# Patient Record
Sex: FEMALE | Race: WHITE | NOT HISPANIC OR LATINO | Employment: FULL TIME | URBAN - METROPOLITAN AREA
[De-identification: names, ages, dates, MRNs, and addresses within clinical notes are randomized per-mention and may not be internally consistent; named-entity substitution may affect disease eponyms.]

---

## 2017-01-17 ENCOUNTER — ALLSCRIPTS OFFICE VISIT (OUTPATIENT)
Dept: OTHER | Facility: OTHER | Age: 47
End: 2017-01-17

## 2017-01-17 DIAGNOSIS — Z12.31 ENCOUNTER FOR SCREENING MAMMOGRAM FOR MALIGNANT NEOPLASM OF BREAST: ICD-10-CM

## 2017-01-23 ENCOUNTER — LAB CONVERSION - ENCOUNTER (OUTPATIENT)
Dept: OTHER | Facility: OTHER | Age: 47
End: 2017-01-23

## 2017-01-23 LAB
ADDITIONAL INFORMATION (HISTORICAL): NORMAL
ADEQUACY: (HISTORICAL): NORMAL
COMMENT (HISTORICAL): NORMAL
CYTOTECHNOLOGIST: (HISTORICAL): NORMAL
HPV MRNA E6/E7 (HISTORICAL): NOT DETECTED
INTERPRETATION (HISTORICAL): NORMAL
LMP (HISTORICAL): NORMAL
PREV. BX: (HISTORICAL): NORMAL
PREV. PAP (HISTORICAL): NORMAL
SOURCE (HISTORICAL): NORMAL

## 2017-02-01 ENCOUNTER — ALLSCRIPTS OFFICE VISIT (OUTPATIENT)
Dept: OTHER | Facility: OTHER | Age: 47
End: 2017-02-01

## 2017-12-27 ENCOUNTER — GENERIC CONVERSION - ENCOUNTER (OUTPATIENT)
Dept: OTHER | Facility: OTHER | Age: 47
End: 2017-12-27

## 2018-01-08 ENCOUNTER — ALLSCRIPTS OFFICE VISIT (OUTPATIENT)
Dept: OTHER | Facility: OTHER | Age: 48
End: 2018-01-08

## 2018-01-09 NOTE — PROGRESS NOTES
Assessment   1  Shingles (053 9) (B02 9)    Plan   Shingles    · Gabapentin 100 MG Oral Capsule; 1 capsule at bedtime for 3 days, then 2 tabs at    bedtime for 3 days, then 3 capsules at bedtime   · ValACYclovir HCl - 1 GM Oral Tablet; TAKE 1 TABLET 3 times daily    Discussion/Summary      Patient presents for evaluation of herpes zoster  of this condition discussed with patient  start her on Valtrex for a week  patient has been experiencing symptoms of herpetic neuralgia- will start her on gabapentin at nighttime  may require this medication for the next 4-8 weeks  advised patient to apply topical Neosporin as needed and discontinue topical corticosteroids that she has been using within past few days  may use ibuprofen as needed daytime  advised patient to contact me with any further questions and concerns or persistence of pain  The patient was counseled regarding instructions for management,-- impressions,-- risks and benefits of treatment options  Possible side effects of new medications were reviewed with the patient/guardian today  The treatment plan was reviewed with the patient/guardian  The patient/guardian understands and agrees with the treatment plan      Chief Complaint   Pt is c/o rash on left shoulder  Pt states that it started as an itch but now she says it burns  Pt states that her arm pit also hurts  Pt states that she noticed this Saturday after lunch  History of Present Illness   HPI: Pruritic/burning rash left shoulder patient has noticed it ago of after exercising at the gym is concerned about possibility of ringworm states that her left arm an armpit is feeling slightly achy recent travel, no fever, no other symptoms          Review of Systems        Constitutional: No fever, no chills, feels well, no tiredness, no recent weight gain or loss        Cardiovascular: no complaints of slow or fast heart rate, no chest pain, no palpitations, no leg claudication or lower extremity edema  Respiratory: no complaints of shortness of breath, no wheezing, no dyspnea on exertion, no orthopnea or PND  Musculoskeletal: as noted in HPI  Integumentary: rash  Neurological: as noted in HPI  Active Problems   1  Acute sinusitis (461 9) (J01 90)   2  Allergic rhinitis (477 9) (J30 9)   3  Dermatofibroma (216 9) (D23 9)   4  Hematuria (599 70) (R31 9)   5  History of allergy (V15 09) (Z88 9)   6  Irritable bowel syndrome (564 1) (K58 9)    Past Medical History   1  History of Abdominal pain, periumbilical (190 34) (P25 28)   2  History of Acute upper respiratory infection (465 9) (J06 9)   3  History of Angiography Pulmonary   4  History of Bronchospasm (519 11) (J98 01)   5  History of Dysfunction of both eustachian tubes (381 81) (H69 83)   6  History of ECG Normal Variant   7  History of Encounter for gynecological examination (V72 31) (Z01 419)   8  History of acute bronchitis (V12 69) (Z87 09)   9  History of acute sinusitis (V12 69) (Z87 09)   10  History of acute sinusitis (V12 69) (Z87 09)   11  History of chest pain (V13 89) (Z87 898)   12  History of conjunctivitis (V12 49) (Z86 69)   13  History of dizziness (V13 89) (Z87 898)   14  History of fatigue (V13 89) (Z87 898)   15  History of fatigue (V13 89) (Z87 898)   16  History of fatigue (V13 89) (Z87 898)   17  History of onychomycosis (V12 09) (Z86 19)   18  History of screening mammography (V15 89) (Z92 89)   19  History of upper respiratory infection (V12 09) (Z87 09)   20  History of vertigo (V12 49) (Z87 898)   21  History of THUAN (middle ear effusion), right (381 4) (H65 91)   22  History of MRI Head FLAIR  Active Problems And Past Medical History Reviewed: The active problems and past medical history were reviewed and updated today  Family History   Mother    1  Family history of Malignant Melanoma Of The Skin (V16 8)   2  Family history of Thyroid Disorder (V18 19)  Father    3   Family history of Coronary Artery Disease (V17 49)  Paternal Uncle    4  Family history of Polymyalgia Rheumatica  Family History Reviewed: The family history was reviewed and updated today  Social History    · Being A Social Drinker   · Never A Smoker   · Working Full Time  The social history was reviewed and updated today  Surgical History   1  History of  Section Low Transverse  Surgical History Reviewed: The surgical history was reviewed and updated today  Current Meds    1  ProAir  (90 Base) MCG/ACT Inhalation Aerosol Solution; INHALE 2 PUFFS     EVERY 4-6 HOURS AS NEEDED; Therapy: [id number] to Recorded     The medication list was reviewed and updated today  Allergies   1  Sulfa Drugs    Vitals    Recorded: [id number] 03:22PM   Temperature 97 3 F   Heart Rate 70   Systolic 413   Diastolic 68   Height 5 ft 6 in   Weight 145 lb 4 oz   BMI Calculated 23 44   BSA Calculated 1 75     Physical Exam        Constitutional      General appearance: No acute distress, well appearing and well nourished  Musculoskeletal      Gait and station: Normal        Neurologic      Cranial nerves: Cranial nerves 2-12 intact  Psychiatric      Orientation to person, place, and time: Normal        Mood and affect: Normal        Additional Exam:  Vesicular rash on erythematous base left anterior and posterior shoulder, tiny patches similar rash at left clavicle  Signatures    Electronically signed by :  TEREZA Isabel ; 2018 11:14PM EST                       (Author)

## 2018-01-13 VITALS
DIASTOLIC BLOOD PRESSURE: 68 MMHG | HEART RATE: 76 BPM | HEIGHT: 66 IN | SYSTOLIC BLOOD PRESSURE: 112 MMHG | BODY MASS INDEX: 22.74 KG/M2 | WEIGHT: 141.5 LBS | RESPIRATION RATE: 14 BRPM | TEMPERATURE: 98.4 F

## 2018-01-15 VITALS
DIASTOLIC BLOOD PRESSURE: 74 MMHG | HEIGHT: 66 IN | BODY MASS INDEX: 22.74 KG/M2 | WEIGHT: 141.5 LBS | SYSTOLIC BLOOD PRESSURE: 110 MMHG

## 2018-01-23 VITALS
HEART RATE: 70 BPM | TEMPERATURE: 97.3 F | SYSTOLIC BLOOD PRESSURE: 122 MMHG | HEIGHT: 66 IN | BODY MASS INDEX: 23.34 KG/M2 | WEIGHT: 145.25 LBS | DIASTOLIC BLOOD PRESSURE: 68 MMHG

## 2018-01-24 VITALS
RESPIRATION RATE: 14 BRPM | TEMPERATURE: 97.5 F | SYSTOLIC BLOOD PRESSURE: 110 MMHG | BODY MASS INDEX: 22.88 KG/M2 | WEIGHT: 142.38 LBS | HEIGHT: 66 IN | HEART RATE: 74 BPM | DIASTOLIC BLOOD PRESSURE: 72 MMHG

## 2018-04-23 ENCOUNTER — OFFICE VISIT (OUTPATIENT)
Dept: FAMILY MEDICINE CLINIC | Facility: CLINIC | Age: 48
End: 2018-04-23
Payer: COMMERCIAL

## 2018-04-23 VITALS
HEART RATE: 58 BPM | WEIGHT: 142.4 LBS | BODY MASS INDEX: 22.88 KG/M2 | DIASTOLIC BLOOD PRESSURE: 74 MMHG | HEIGHT: 66 IN | TEMPERATURE: 98 F | RESPIRATION RATE: 14 BRPM | SYSTOLIC BLOOD PRESSURE: 128 MMHG

## 2018-04-23 DIAGNOSIS — Z13.6 SCREENING FOR CARDIOVASCULAR CONDITION: ICD-10-CM

## 2018-04-23 DIAGNOSIS — L63.9 ALOPECIA AREATA: Primary | ICD-10-CM

## 2018-04-23 PROCEDURE — 3008F BODY MASS INDEX DOCD: CPT | Performed by: FAMILY MEDICINE

## 2018-04-23 PROCEDURE — 99213 OFFICE O/P EST LOW 20 MIN: CPT | Performed by: FAMILY MEDICINE

## 2018-04-23 RX ORDER — CETIRIZINE HYDROCHLORIDE 10 MG/1
10 TABLET ORAL DAILY
COMMUNITY
End: 2022-03-28

## 2018-04-23 RX ORDER — MOMETASONE FUROATE 50 UG/1
2 SPRAY, METERED NASAL DAILY
COMMUNITY
Start: 2017-02-01

## 2018-04-23 RX ORDER — ALBUTEROL SULFATE 90 UG/1
2 AEROSOL, METERED RESPIRATORY (INHALATION)
COMMUNITY
Start: 2017-02-01 | End: 2019-04-12 | Stop reason: SDUPTHER

## 2018-04-23 NOTE — PROGRESS NOTES
FAMILY PRACTICE OFFICE VISIT       NAME: Asif Ortega  AGE: 50 y o  SEX: female       : 1970        MRN: [id number]    DATE: 2018  TIME: 6:35 AM    Assessment and Plan     Problem List Items Addressed This Visit     None      Visit Diagnoses     Alopecia areata    -  Primary    Relevant Medications    fluocinonide (LIDEX) 0 05 % cream    Other Relevant Orders    Ambulatory referral to Dermatology    CBC    Comprehensive metabolic panel    TSH, 3rd generation    Vitamin B12    Vitamin D 25 hydroxy    Screening for cardiovascular condition        Relevant Orders    Lipid panel        Patient presents for evaluation of scalp dermatitis, possible alopecia areata  Will start her on high potency topical corticosteroid cream   Referral to Dermatology, she may benefit from intradermal steroid injections  Will proceed with blood work as outlined above  There are no Patient Instructions on file for this visit  Chief Complaint     Chief Complaint   Patient presents with    Hair/Scalp Problem     Patient is here c/o a red itchy scalp x's 1+ mths  History of Present Illness     Patient presents for evaluation of localized pruritic area of left parietal scalp  It was noted by her hairdresser  Loss of hair growth in this localized area  Patient does admit to symptoms of fatigue and overall hair loss lately  No other balding spots  Patient has tried topical moisturizers with minimal improvement  Review of Systems   Review of Systems   Constitutional: Negative  Respiratory: Negative  Cardiovascular: Negative  Skin:        Hair loss, localized area of pruritis and balding left parietal scalp   Neurological: Negative  Psychiatric/Behavioral: Negative  Active Problem List   There is no problem list on file for this patient        Past Medical History:  Past Medical History:   Diagnosis Date    Bronchospasm     Chest pain     Conjunctivitis     Dizziness     Middle ear effusion, right     last assessed-2017    Onychomycosis     Vertigo        Past Surgical History:  Past Surgical History:   Procedure Laterality Date     SECTION, LOW TRANSVERSE      OTHER SURGICAL HISTORY      Angiography pulmonary-2007- no evidence of pulmonary embolus, no evidence of occult pulmonary disease, 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 just lateral to the costovertebral junction      OTHER SURGICAL HISTORY      ECG normal variant-2007       Family History:  Family History   Problem Relation Age of Onset    Skin cancer Mother      melanoma    Thyroid disease Mother     Coronary artery disease Father     Polymyalgia rheumatica Paternal Uncle        Social History:  Social History     Social History    Marital status: /Civil Union     Spouse name: N/A    Number of children: N/A    Years of education: N/A     Occupational History          working full time     Social History Main Topics    Smoking status: Never Smoker    Smokeless tobacco: Never Used    Alcohol use Yes      Comment: social    Drug use: No    Sexual activity: Not on file     Other Topics Concern    Not on file     Social History Narrative    No narrative on file       Objective     Vitals:    18 1605   BP: 128/74   Pulse: 58   Resp: 14   Temp: 98 °F (36 7 °C)     Wt Readings from Last 3 Encounters:   18 64 6 kg (142 lb 6 4 oz)   18 65 9 kg (145 lb 4 oz)   17 64 6 kg (142 lb 6 1 oz)       Physical Exam   Constitutional: She is oriented to person, place, and time  HENT:   Head: Normocephalic and atraumatic  Neck: Neck supple  Neurological: She is alert and oriented to person, place, and time     Skin:   Fairly well circumcised erythematous area of irritated skin and hair loss left parietal scalp, approximately 4x2 cm, no other balding areas noted throughout the scalp, no seborrhea, no noticeable hair thinning  Psychiatric: She has a normal mood and affect  Her behavior is normal    Nursing note and vitals reviewed  Pertinent Laboratory/Diagnostic Studies:  No results found for: GLUCOSE, BUN, CREATININE, CALCIUM, NA, K, CO2, CL  No results found for: ALT, AST, GGT, ALKPHOS, BILITOT    No results found for: WBC, HGB, HCT, MCV, PLT    No results found for: TSH    No results found for: CHOL  No results found for: TRIG  No results found for: HDL  No results found for: LDLCALC  No results found for: HGBA1C    Results for orders placed or performed in visit on 01/23/17   Thinprep Tis and HPV mRNA E6/E7 (Historical)   Result Value Ref Range    ADDITIONAL INFORMATION none given     LMP (HISTORICAL) 685526     PREV  PAP (HISTORICAL) NONE GIVEN     PREV  BX: (HISTORICAL) NONE GIVEN     SOURCE Cervix     Adequacy: (HISTORICAL)       Satisfactory for evaluation  Endocervical/transformation zone componentpresent  INTERPRETATION (HISTORICAL) Negative for intraepithelial lesion or malignancy  COMMENT       This Pap test has been evaluated with Get[employer] technology      CYTOTECHNOLOGIST: (HISTORICAL)       ALK, CT(ASCP)CT screening location: [facility], [place] [address]    HPV mRNA E6/E7 (HISTORICAL) Not Detected Not Detected       Orders Placed This Encounter   Procedures    CBC    Comprehensive metabolic panel    Lipid panel    TSH, 3rd generation    Vitamin B12    Vitamin D 25 hydroxy    Ambulatory referral to Dermatology       ALLERGIES:  Allergies   Allergen Reactions    Sulfa Antibiotics Hives       Current Medications     Current Outpatient Prescriptions   Medication Sig Dispense Refill    cetirizine (ZyrTEC) 10 mg tablet Take 10 mg by mouth daily      mometasone (NASONEX) 50 mcg/act nasal spray 2 sprays into each nostril daily      albuterol (PROAIR HFA) 90 mcg/act inhaler Inhale 2 puffs      fluocinonide (LIDEX) 0 05 % cream Apply topically 2 (two) times a day 60 g 1     No current facility-administered medications for this visit  Health Maintenance   There are no preventive care reminders to display for this patient    Immunization History   Administered Date(s) Administered    Influenza TIV (IM) 12/05/2009    Tdap 06/20/2013       Alfredo Luu MD

## 2018-04-28 ENCOUNTER — APPOINTMENT (OUTPATIENT)
Dept: LAB | Facility: CLINIC | Age: 48
End: 2018-04-28
Payer: COMMERCIAL

## 2018-04-28 DIAGNOSIS — L63.9 ALOPECIA AREATA: ICD-10-CM

## 2018-04-28 DIAGNOSIS — Z13.6 SCREENING FOR CARDIOVASCULAR CONDITION: ICD-10-CM

## 2018-04-28 LAB
25(OH)D3 SERPL-MCNC: 16.2 NG/ML (ref 30–100)
ALBUMIN SERPL BCP-MCNC: 3.7 G/DL (ref 3.5–5)
ALP SERPL-CCNC: 26 U/L (ref 46–116)
ALT SERPL W P-5'-P-CCNC: 25 U/L (ref 12–78)
ANION GAP SERPL CALCULATED.3IONS-SCNC: 6 MMOL/L (ref 4–13)
AST SERPL W P-5'-P-CCNC: 25 U/L (ref 5–45)
BILIRUB SERPL-MCNC: 0.92 MG/DL (ref 0.2–1)
BUN SERPL-MCNC: 17 MG/DL (ref 5–25)
CALCIUM SERPL-MCNC: 8.4 MG/DL (ref 8.3–10.1)
CHLORIDE SERPL-SCNC: 107 MMOL/L (ref 100–108)
CHOLEST SERPL-MCNC: 141 MG/DL (ref 50–200)
CO2 SERPL-SCNC: 28 MMOL/L (ref 21–32)
CREAT SERPL-MCNC: 0.87 MG/DL (ref 0.6–1.3)
ERYTHROCYTE [DISTWIDTH] IN BLOOD BY AUTOMATED COUNT: 12.3 % (ref 11.6–15.1)
GFR SERPL CREATININE-BSD FRML MDRD: 78 ML/MIN/1.73SQ M
GLUCOSE P FAST SERPL-MCNC: 85 MG/DL (ref 65–99)
HCT VFR BLD AUTO: 41.4 % (ref 34.8–46.1)
HDLC SERPL-MCNC: 81 MG/DL (ref 40–60)
HGB BLD-MCNC: 13.5 G/DL (ref 11.5–15.4)
LDLC SERPL CALC-MCNC: 50 MG/DL (ref 0–100)
MCH RBC QN AUTO: 32 PG (ref 26.8–34.3)
MCHC RBC AUTO-ENTMCNC: 32.6 G/DL (ref 31.4–37.4)
MCV RBC AUTO: 98 FL (ref 82–98)
NONHDLC SERPL-MCNC: 60 MG/DL
PLATELET # BLD AUTO: 317 THOUSANDS/UL (ref 149–390)
PMV BLD AUTO: 10.3 FL (ref 8.9–12.7)
POTASSIUM SERPL-SCNC: 4.4 MMOL/L (ref 3.5–5.3)
PROT SERPL-MCNC: 6.9 G/DL (ref 6.4–8.2)
RBC # BLD AUTO: 4.22 MILLION/UL (ref 3.81–5.12)
SODIUM SERPL-SCNC: 141 MMOL/L (ref 136–145)
TRIGL SERPL-MCNC: 48 MG/DL
TSH SERPL DL<=0.05 MIU/L-ACNC: 2.86 UIU/ML (ref 0.36–3.74)
VIT B12 SERPL-MCNC: 357 PG/ML (ref 100–900)
WBC # BLD AUTO: 4.97 THOUSAND/UL (ref 4.31–10.16)

## 2018-04-28 PROCEDURE — 85027 COMPLETE CBC AUTOMATED: CPT

## 2018-04-28 PROCEDURE — 82306 VITAMIN D 25 HYDROXY: CPT

## 2018-04-28 PROCEDURE — 80053 COMPREHEN METABOLIC PANEL: CPT

## 2018-04-28 PROCEDURE — 84443 ASSAY THYROID STIM HORMONE: CPT

## 2018-04-28 PROCEDURE — 80061 LIPID PANEL: CPT

## 2018-04-28 PROCEDURE — 82607 VITAMIN B-12: CPT

## 2018-04-28 PROCEDURE — 36415 COLL VENOUS BLD VENIPUNCTURE: CPT

## 2018-04-30 ENCOUNTER — ANNUAL EXAM (OUTPATIENT)
Dept: OBGYN CLINIC | Facility: CLINIC | Age: 48
End: 2018-04-30
Payer: COMMERCIAL

## 2018-04-30 VITALS
SYSTOLIC BLOOD PRESSURE: 120 MMHG | WEIGHT: 141.8 LBS | BODY MASS INDEX: 22.79 KG/M2 | DIASTOLIC BLOOD PRESSURE: 78 MMHG | HEIGHT: 66 IN

## 2018-04-30 DIAGNOSIS — Z01.419 WOMEN'S ANNUAL ROUTINE GYNECOLOGICAL EXAMINATION: Primary | ICD-10-CM

## 2018-04-30 PROCEDURE — S0612 ANNUAL GYNECOLOGICAL EXAMINA: HCPCS | Performed by: OBSTETRICS & GYNECOLOGY

## 2018-04-30 NOTE — PROGRESS NOTES
This is a 49-year-old white female she is a  2 para 2 with 2 prior  sections  Her menstrual cycles are regular and predictable but sometimes heavy  Will now keep track of that  She may need hormonal intervention  Her current method of contraception includes condoms  She is happy with this method  Denies any any major  GI complaint  There is no problem with intimacy  Family history significant for mother and father with heart disease requiring valve replacement  Upon further questioning patient admits that she is under increased stress at home and at work as a   She denies depression however she is happy with her weight  She exercises on a regular basis  She has a dentist on a regular basis  Review of systems positive for increased stress at home work  Social history is negative for tobacco positive for social alcohol      Surgical history significant for 2 prior  section      Medication she has been treated for sinus infections  She also had a recent episode of shingles which is now every resolved      Physical exam    This is a well-developed well-nourished petite white female  She is in no acute distress  Her HEENT is was within normal limits  Cardiac exam shows a regular rhythm and rate  Lungs are clear to A&P  Breast cyst bilaterally bilaterally small symmetrical without masses no pain no lumps  Axillae:  Gland clear bilaterally  Abdominal exam is soft and there are no masses there is prior  scar is healed well  Pelvic examination the external genitalia normal limits the vagina is clean uterus is small anterior adnexa clear  A Pap smear was performed  Impression stable gyn examination  Pap smear was performed  Some irregular cycles prior secondary to perimenopause  Will continue to monitor  We have asked the patient to keep a menstrual calendar keep me informed of her progress  She will make a mammogram as needed    Return my office in 1 year

## 2018-04-30 NOTE — PATIENT INSTRUCTIONS
The patient was informed of a stable gyn examination  A Pap smear was performed  She will keep her menstrual calendar  Her cycles may be reflective her early perimenopausal changes

## 2018-05-02 ENCOUNTER — TELEPHONE (OUTPATIENT)
Dept: FAMILY MEDICINE CLINIC | Facility: CLINIC | Age: 48
End: 2018-05-02

## 2018-05-02 DIAGNOSIS — E55.9 VITAMIN D DEFICIENCY: Primary | ICD-10-CM

## 2018-05-02 DIAGNOSIS — E53.8 VITAMIN B12 DEFICIENCY: ICD-10-CM

## 2018-05-02 RX ORDER — ERGOCALCIFEROL 1.25 MG/1
50000 CAPSULE ORAL WEEKLY
Qty: 4 CAPSULE | Refills: 2 | Status: SHIPPED | OUTPATIENT
Start: 2018-05-02 | End: 2019-07-31

## 2018-05-02 NOTE — TELEPHONE ENCOUNTER
Please contact patient  I received her blood work  It was all normal aside from very low level of vitamin-D that could be contributing to hair loss and rash  And decreased level of vitamin B12  Her vitamin-D was 16 with optimal level of over 40  Vitamin B12 level was 350 with optimal level of 500-600    At this time I advised patient to start vitamin D2 41040 units once a week for the next 12 weeks  We need to recheck blood work in 3 months  She should also start vitamin B12 over the counter, 1000 mcg daily  I will send prescription for vitamin D2 to her pharmacy    Blood work orders printed, thank you

## 2018-05-03 LAB
CLINICAL INFO: NORMAL
CYTO CVX: NORMAL
CYTOLOGY CMNT CVX/VAG CYTO-IMP: NORMAL
DATE PREVIOUS BX: NORMAL
HPV E6+E7 MRNA CVX QL NAA+PROBE: NOT DETECTED
LMP START DATE: NORMAL
SL AMB PREV. PAP:: NORMAL
SPECIMEN SOURCE CVX/VAG CYTO: NORMAL

## 2018-08-09 ENCOUNTER — APPOINTMENT (OUTPATIENT)
Dept: LAB | Facility: CLINIC | Age: 48
End: 2018-08-09
Payer: COMMERCIAL

## 2018-08-09 ENCOUNTER — TRANSCRIBE ORDERS (OUTPATIENT)
Dept: LAB | Facility: CLINIC | Age: 48
End: 2018-08-09

## 2018-08-09 DIAGNOSIS — E55.9 VITAMIN D DEFICIENCY: ICD-10-CM

## 2018-08-09 DIAGNOSIS — E53.8 VITAMIN B12 DEFICIENCY: ICD-10-CM

## 2018-08-09 LAB
25(OH)D3 SERPL-MCNC: 33.3 NG/ML (ref 30–100)
VIT B12 SERPL-MCNC: 357 PG/ML (ref 100–900)

## 2018-08-09 PROCEDURE — 36415 COLL VENOUS BLD VENIPUNCTURE: CPT

## 2018-08-09 PROCEDURE — 82306 VITAMIN D 25 HYDROXY: CPT

## 2018-08-09 PROCEDURE — 82607 VITAMIN B-12: CPT

## 2018-08-13 ENCOUNTER — TELEPHONE (OUTPATIENT)
Dept: FAMILY MEDICINE CLINIC | Facility: CLINIC | Age: 48
End: 2018-08-13

## 2018-08-13 NOTE — TELEPHONE ENCOUNTER
----- Message from Dewayne Alfonso MD sent at 8/12/2018  5:43 PM EDT -----  Please contact patient  Her vitamin-D level has improved significantly  Patient should complete prescription strength vitamin D2 once a week and then switch to over-the-counter vitamin D3 2000 U once a day  Her vitamin B12 level has not unfortunately changed with over-the-counter oral supplements  I believe she would be a good candidate for vitamin B12 injections as they could provide improved/increased energy level  We can schedule with the nurse as B12 injections every 2 weeks x 3  and then once a month ( if pt is agreeable)   Patient will discontinue vitamin B12 over-the-counter once we start injections    Thanks

## 2018-08-15 ENCOUNTER — CLINICAL SUPPORT (OUTPATIENT)
Dept: FAMILY MEDICINE CLINIC | Facility: CLINIC | Age: 48
End: 2018-08-15
Payer: COMMERCIAL

## 2018-08-15 DIAGNOSIS — E53.8 B12 DEFICIENCY: Primary | ICD-10-CM

## 2018-08-15 PROCEDURE — 96372 THER/PROPH/DIAG INJ SC/IM: CPT | Performed by: FAMILY MEDICINE

## 2018-08-16 RX ORDER — CYANOCOBALAMIN 1000 UG/ML
1000 INJECTION INTRAMUSCULAR; SUBCUTANEOUS ONCE
Status: COMPLETED | OUTPATIENT
Start: 2018-08-16 | End: 2018-08-16

## 2018-08-16 RX ADMIN — CYANOCOBALAMIN 1000 MCG: 1000 INJECTION INTRAMUSCULAR; SUBCUTANEOUS at 13:43

## 2018-08-29 ENCOUNTER — CLINICAL SUPPORT (OUTPATIENT)
Dept: FAMILY MEDICINE CLINIC | Facility: CLINIC | Age: 48
End: 2018-08-29
Payer: COMMERCIAL

## 2018-08-29 VITALS — TEMPERATURE: 97.9 F

## 2018-08-29 DIAGNOSIS — E53.8 B12 DEFICIENCY: Primary | ICD-10-CM

## 2018-08-29 PROCEDURE — 96372 THER/PROPH/DIAG INJ SC/IM: CPT | Performed by: FAMILY MEDICINE

## 2018-08-29 RX ORDER — CYANOCOBALAMIN 1000 UG/ML
1000 INJECTION INTRAMUSCULAR; SUBCUTANEOUS
Status: DISCONTINUED | OUTPATIENT
Start: 2018-08-29 | End: 2022-03-28

## 2018-08-29 RX ADMIN — CYANOCOBALAMIN 1000 MCG: 1000 INJECTION INTRAMUSCULAR; SUBCUTANEOUS at 17:02

## 2018-09-12 ENCOUNTER — CLINICAL SUPPORT (OUTPATIENT)
Dept: FAMILY MEDICINE CLINIC | Facility: CLINIC | Age: 48
End: 2018-09-12
Payer: COMMERCIAL

## 2018-09-12 DIAGNOSIS — E53.8 B12 DEFICIENCY: Primary | ICD-10-CM

## 2018-09-12 PROCEDURE — 96372 THER/PROPH/DIAG INJ SC/IM: CPT

## 2018-09-12 RX ORDER — CYANOCOBALAMIN 1000 UG/ML
1000 INJECTION INTRAMUSCULAR; SUBCUTANEOUS
Status: DISCONTINUED | OUTPATIENT
Start: 2018-09-12 | End: 2022-03-28

## 2018-09-12 RX ADMIN — CYANOCOBALAMIN 1000 MCG: 1000 INJECTION INTRAMUSCULAR; SUBCUTANEOUS at 16:44

## 2018-10-17 ENCOUNTER — CLINICAL SUPPORT (OUTPATIENT)
Dept: FAMILY MEDICINE CLINIC | Facility: CLINIC | Age: 48
End: 2018-10-17
Payer: COMMERCIAL

## 2018-10-17 DIAGNOSIS — E53.8 VITAMIN B 12 DEFICIENCY: Primary | ICD-10-CM

## 2018-10-17 RX ORDER — CYANOCOBALAMIN 1000 UG/ML
1000 INJECTION INTRAMUSCULAR; SUBCUTANEOUS
Status: DISCONTINUED | OUTPATIENT
Start: 2018-10-17 | End: 2022-03-28

## 2018-10-17 RX ADMIN — CYANOCOBALAMIN 1000 MCG: 1000 INJECTION INTRAMUSCULAR; SUBCUTANEOUS at 17:17

## 2018-11-19 ENCOUNTER — CLINICAL SUPPORT (OUTPATIENT)
Dept: FAMILY MEDICINE CLINIC | Facility: CLINIC | Age: 48
End: 2018-11-19
Payer: COMMERCIAL

## 2018-11-19 DIAGNOSIS — E53.8 B12 DEFICIENCY: Primary | ICD-10-CM

## 2018-11-19 RX ORDER — CYANOCOBALAMIN 1000 UG/ML
1000 INJECTION INTRAMUSCULAR; SUBCUTANEOUS ONCE
Status: COMPLETED | OUTPATIENT
Start: 2018-11-19 | End: 2018-11-19

## 2018-11-19 RX ADMIN — CYANOCOBALAMIN 1000 MCG: 1000 INJECTION INTRAMUSCULAR; SUBCUTANEOUS at 17:55

## 2018-12-19 ENCOUNTER — CLINICAL SUPPORT (OUTPATIENT)
Dept: FAMILY MEDICINE CLINIC | Facility: CLINIC | Age: 48
End: 2018-12-19
Payer: COMMERCIAL

## 2018-12-19 DIAGNOSIS — E53.8 VITAMIN B 12 DEFICIENCY: Primary | ICD-10-CM

## 2018-12-19 RX ORDER — CYANOCOBALAMIN 1000 UG/ML
1000 INJECTION INTRAMUSCULAR; SUBCUTANEOUS
Status: DISCONTINUED | OUTPATIENT
Start: 2018-12-19 | End: 2022-03-28

## 2018-12-19 RX ADMIN — CYANOCOBALAMIN 1000 MCG: 1000 INJECTION INTRAMUSCULAR; SUBCUTANEOUS at 16:32

## 2019-01-23 ENCOUNTER — CLINICAL SUPPORT (OUTPATIENT)
Dept: FAMILY MEDICINE CLINIC | Facility: CLINIC | Age: 49
End: 2019-01-23
Payer: COMMERCIAL

## 2019-01-23 DIAGNOSIS — E53.8 B12 DEFICIENCY: Primary | ICD-10-CM

## 2019-01-23 PROCEDURE — 96372 THER/PROPH/DIAG INJ SC/IM: CPT

## 2019-01-23 RX ORDER — CYANOCOBALAMIN 1000 UG/ML
1000 INJECTION INTRAMUSCULAR; SUBCUTANEOUS ONCE
Status: COMPLETED | OUTPATIENT
Start: 2019-01-23 | End: 2019-01-23

## 2019-01-23 RX ADMIN — CYANOCOBALAMIN 1000 MCG: 1000 INJECTION INTRAMUSCULAR; SUBCUTANEOUS at 18:27

## 2019-02-21 ENCOUNTER — CLINICAL SUPPORT (OUTPATIENT)
Dept: FAMILY MEDICINE CLINIC | Facility: CLINIC | Age: 49
End: 2019-02-21
Payer: COMMERCIAL

## 2019-02-21 ENCOUNTER — TELEPHONE (OUTPATIENT)
Dept: FAMILY MEDICINE CLINIC | Facility: CLINIC | Age: 49
End: 2019-02-21

## 2019-02-21 DIAGNOSIS — E53.8 VITAMIN B12 DEFICIENCY: Primary | ICD-10-CM

## 2019-02-21 PROCEDURE — 96372 THER/PROPH/DIAG INJ SC/IM: CPT

## 2019-02-21 RX ORDER — CYANOCOBALAMIN 1000 UG/ML
1000 INJECTION INTRAMUSCULAR; SUBCUTANEOUS ONCE
Status: COMPLETED | OUTPATIENT
Start: 2019-02-21 | End: 2019-02-21

## 2019-02-21 RX ADMIN — CYANOCOBALAMIN 1000 MCG: 1000 INJECTION INTRAMUSCULAR; SUBCUTANEOUS at 17:23

## 2019-04-12 ENCOUNTER — OFFICE VISIT (OUTPATIENT)
Dept: FAMILY MEDICINE CLINIC | Facility: CLINIC | Age: 49
End: 2019-04-12
Payer: COMMERCIAL

## 2019-04-12 VITALS
TEMPERATURE: 98.1 F | WEIGHT: 140 LBS | HEART RATE: 68 BPM | SYSTOLIC BLOOD PRESSURE: 118 MMHG | HEIGHT: 66 IN | RESPIRATION RATE: 16 BRPM | DIASTOLIC BLOOD PRESSURE: 82 MMHG | BODY MASS INDEX: 22.5 KG/M2

## 2019-04-12 DIAGNOSIS — J01.91 ACUTE RECURRENT SINUSITIS, UNSPECIFIED LOCATION: Primary | ICD-10-CM

## 2019-04-12 DIAGNOSIS — J30.89 ALLERGIC RHINITIS DUE TO OTHER ALLERGIC TRIGGER, UNSPECIFIED SEASONALITY: ICD-10-CM

## 2019-04-12 PROCEDURE — 99213 OFFICE O/P EST LOW 20 MIN: CPT | Performed by: FAMILY MEDICINE

## 2019-04-12 PROCEDURE — 3008F BODY MASS INDEX DOCD: CPT | Performed by: FAMILY MEDICINE

## 2019-04-12 RX ORDER — ALBUTEROL SULFATE 90 UG/1
2 AEROSOL, METERED RESPIRATORY (INHALATION) EVERY 4 HOURS PRN
Qty: 1 INHALER | Refills: 1 | Status: SHIPPED | OUTPATIENT
Start: 2019-04-12 | End: 2021-09-27 | Stop reason: SDUPTHER

## 2019-04-12 RX ORDER — CEFPROZIL 500 MG/1
500 TABLET, FILM COATED ORAL 2 TIMES DAILY
Qty: 14 TABLET | Refills: 0 | Status: SHIPPED | OUTPATIENT
Start: 2019-04-12 | End: 2019-04-19

## 2019-04-23 ENCOUNTER — TELEPHONE (OUTPATIENT)
Dept: FAMILY MEDICINE CLINIC | Facility: CLINIC | Age: 49
End: 2019-04-23

## 2019-04-23 ENCOUNTER — CLINICAL SUPPORT (OUTPATIENT)
Dept: FAMILY MEDICINE CLINIC | Facility: CLINIC | Age: 49
End: 2019-04-23
Payer: COMMERCIAL

## 2019-04-23 DIAGNOSIS — E53.8 VITAMIN B12 DEFICIENCY: Primary | ICD-10-CM

## 2019-04-23 PROCEDURE — 96372 THER/PROPH/DIAG INJ SC/IM: CPT

## 2019-04-23 RX ORDER — CYANOCOBALAMIN 1000 UG/ML
1000 INJECTION INTRAMUSCULAR; SUBCUTANEOUS ONCE
Status: COMPLETED | OUTPATIENT
Start: 2019-04-23 | End: 2019-04-23

## 2019-04-23 RX ADMIN — CYANOCOBALAMIN 1000 MCG: 1000 INJECTION INTRAMUSCULAR; SUBCUTANEOUS at 16:50

## 2019-04-24 DIAGNOSIS — E53.8 VITAMIN B12 DEFICIENCY: Primary | ICD-10-CM

## 2019-04-24 DIAGNOSIS — Z13.6 SCREENING FOR CARDIOVASCULAR CONDITION: ICD-10-CM

## 2019-04-24 DIAGNOSIS — R53.83 FATIGUE, UNSPECIFIED TYPE: ICD-10-CM

## 2019-05-04 ENCOUNTER — APPOINTMENT (OUTPATIENT)
Dept: LAB | Facility: CLINIC | Age: 49
End: 2019-05-04
Payer: COMMERCIAL

## 2019-05-04 ENCOUNTER — TRANSCRIBE ORDERS (OUTPATIENT)
Dept: LAB | Facility: CLINIC | Age: 49
End: 2019-05-04

## 2019-05-04 DIAGNOSIS — Z13.6 SCREENING FOR CARDIOVASCULAR CONDITION: ICD-10-CM

## 2019-05-04 DIAGNOSIS — R53.83 FATIGUE, UNSPECIFIED TYPE: ICD-10-CM

## 2019-05-04 DIAGNOSIS — L93.0 LUPUS ERYTHEMATOSUS, UNSPECIFIED FORM: Primary | ICD-10-CM

## 2019-05-04 DIAGNOSIS — L93.0 LUPUS ERYTHEMATOSUS, UNSPECIFIED FORM: ICD-10-CM

## 2019-05-04 DIAGNOSIS — E53.8 VITAMIN B12 DEFICIENCY: ICD-10-CM

## 2019-05-04 LAB
ALBUMIN SERPL BCP-MCNC: 3.9 G/DL (ref 3.5–5)
ALP SERPL-CCNC: 24 U/L (ref 46–116)
ALT SERPL W P-5'-P-CCNC: 19 U/L (ref 12–78)
ANION GAP SERPL CALCULATED.3IONS-SCNC: 4 MMOL/L (ref 4–13)
AST SERPL W P-5'-P-CCNC: 18 U/L (ref 5–45)
BILIRUB SERPL-MCNC: 1.01 MG/DL (ref 0.2–1)
BUN SERPL-MCNC: 16 MG/DL (ref 5–25)
CALCIUM SERPL-MCNC: 8.7 MG/DL (ref 8.3–10.1)
CHLORIDE SERPL-SCNC: 106 MMOL/L (ref 100–108)
CHOLEST SERPL-MCNC: 138 MG/DL (ref 50–200)
CO2 SERPL-SCNC: 28 MMOL/L (ref 21–32)
CREAT SERPL-MCNC: 0.92 MG/DL (ref 0.6–1.3)
ERYTHROCYTE [DISTWIDTH] IN BLOOD BY AUTOMATED COUNT: 12 % (ref 11.6–15.1)
GFR SERPL CREATININE-BSD FRML MDRD: 73 ML/MIN/1.73SQ M
GLUCOSE P FAST SERPL-MCNC: 87 MG/DL (ref 65–99)
HCT VFR BLD AUTO: 39.3 % (ref 34.8–46.1)
HDLC SERPL-MCNC: 76 MG/DL (ref 40–60)
HGB BLD-MCNC: 12.8 G/DL (ref 11.5–15.4)
LDLC SERPL CALC-MCNC: 53 MG/DL (ref 0–100)
MCH RBC QN AUTO: 32.2 PG (ref 26.8–34.3)
MCHC RBC AUTO-ENTMCNC: 32.6 G/DL (ref 31.4–37.4)
MCV RBC AUTO: 99 FL (ref 82–98)
PLATELET # BLD AUTO: 298 THOUSANDS/UL (ref 149–390)
PMV BLD AUTO: 10.4 FL (ref 8.9–12.7)
POTASSIUM SERPL-SCNC: 4.2 MMOL/L (ref 3.5–5.3)
PROT SERPL-MCNC: 7.1 G/DL (ref 6.4–8.2)
RBC # BLD AUTO: 3.97 MILLION/UL (ref 3.81–5.12)
SODIUM SERPL-SCNC: 138 MMOL/L (ref 136–145)
TRIGL SERPL-MCNC: 45 MG/DL
TSH SERPL DL<=0.05 MIU/L-ACNC: 3.33 UIU/ML (ref 0.36–3.74)
VIT B12 SERPL-MCNC: 599 PG/ML (ref 100–900)
WBC # BLD AUTO: 4.86 THOUSAND/UL (ref 4.31–10.16)

## 2019-05-04 PROCEDURE — 86038 ANTINUCLEAR ANTIBODIES: CPT

## 2019-05-04 PROCEDURE — 80053 COMPREHEN METABOLIC PANEL: CPT

## 2019-05-04 PROCEDURE — 85027 COMPLETE CBC AUTOMATED: CPT

## 2019-05-04 PROCEDURE — 84443 ASSAY THYROID STIM HORMONE: CPT

## 2019-05-04 PROCEDURE — 82607 VITAMIN B-12: CPT

## 2019-05-04 PROCEDURE — 80061 LIPID PANEL: CPT

## 2019-05-04 PROCEDURE — 36415 COLL VENOUS BLD VENIPUNCTURE: CPT

## 2019-05-06 ENCOUNTER — TELEPHONE (OUTPATIENT)
Dept: FAMILY MEDICINE CLINIC | Facility: CLINIC | Age: 49
End: 2019-05-06

## 2019-05-06 LAB — RYE IGE QN: NEGATIVE

## 2019-06-05 ENCOUNTER — CLINICAL SUPPORT (OUTPATIENT)
Dept: FAMILY MEDICINE CLINIC | Facility: CLINIC | Age: 49
End: 2019-06-05
Payer: COMMERCIAL

## 2019-06-05 DIAGNOSIS — E53.8 VITAMIN B12 DEFICIENCY: Primary | ICD-10-CM

## 2019-06-05 PROCEDURE — 96372 THER/PROPH/DIAG INJ SC/IM: CPT

## 2019-06-05 RX ORDER — CYANOCOBALAMIN 1000 UG/ML
1000 INJECTION INTRAMUSCULAR; SUBCUTANEOUS
Status: SHIPPED | OUTPATIENT
Start: 2019-06-05

## 2019-06-05 RX ADMIN — CYANOCOBALAMIN 1000 MCG: 1000 INJECTION INTRAMUSCULAR; SUBCUTANEOUS at 16:13

## 2019-07-05 ENCOUNTER — CLINICAL SUPPORT (OUTPATIENT)
Dept: FAMILY MEDICINE CLINIC | Facility: CLINIC | Age: 49
End: 2019-07-05
Payer: COMMERCIAL

## 2019-07-05 DIAGNOSIS — E53.8 VITAMIN B12 DEFICIENCY: Primary | ICD-10-CM

## 2019-07-05 PROCEDURE — 96372 THER/PROPH/DIAG INJ SC/IM: CPT

## 2019-07-05 RX ORDER — CYANOCOBALAMIN 1000 UG/ML
1000 INJECTION INTRAMUSCULAR; SUBCUTANEOUS
Status: DISCONTINUED | OUTPATIENT
Start: 2019-07-05 | End: 2022-03-28

## 2019-07-05 RX ADMIN — CYANOCOBALAMIN 1000 MCG: 1000 INJECTION INTRAMUSCULAR; SUBCUTANEOUS at 14:04

## 2019-07-30 RX ORDER — BETAMETHASONE VALERATE 0.1 %
LOTION (ML) TOPICAL
Refills: 0 | COMMUNITY
Start: 2019-06-06 | End: 2020-02-03

## 2019-07-31 ENCOUNTER — OFFICE VISIT (OUTPATIENT)
Dept: FAMILY MEDICINE CLINIC | Facility: CLINIC | Age: 49
End: 2019-07-31
Payer: COMMERCIAL

## 2019-07-31 VITALS
HEIGHT: 66 IN | RESPIRATION RATE: 16 BRPM | WEIGHT: 137.6 LBS | SYSTOLIC BLOOD PRESSURE: 114 MMHG | HEART RATE: 67 BPM | DIASTOLIC BLOOD PRESSURE: 80 MMHG | TEMPERATURE: 98.6 F | OXYGEN SATURATION: 100 % | BODY MASS INDEX: 22.11 KG/M2

## 2019-07-31 DIAGNOSIS — J01.91 ACUTE RECURRENT SINUSITIS, UNSPECIFIED LOCATION: Primary | ICD-10-CM

## 2019-07-31 PROCEDURE — 99213 OFFICE O/P EST LOW 20 MIN: CPT | Performed by: FAMILY MEDICINE

## 2019-07-31 RX ORDER — PREDNISONE 10 MG/1
TABLET ORAL
Qty: 20 TABLET | Refills: 0 | Status: SHIPPED | OUTPATIENT
Start: 2019-07-31 | End: 2020-02-03

## 2019-07-31 RX ORDER — AMOXICILLIN AND CLAVULANATE POTASSIUM 875; 125 MG/1; MG/1
1 TABLET, FILM COATED ORAL 2 TIMES DAILY WITH MEALS
Qty: 20 TABLET | Refills: 0 | Status: SHIPPED | OUTPATIENT
Start: 2019-07-31 | End: 2019-08-10

## 2019-07-31 NOTE — PROGRESS NOTES
FAMILY PRACTICE OFFICE VISIT       NAME: Gabi Chacko  AGE: 44 y o  SEX: female       : 1970        MRN: [id number]        Assessment and Plan     Problem List Items Addressed This Visit     None      Visit Diagnoses     Acute recurrent sinusitis, unspecified location    -  Primary    Relevant Medications    amoxicillin-clavulanate (AUGMENTIN) 875-125 mg per tablet    predniSONE 10 mg tablet        Patient presents for evaluation of acute sinusitis  Will start her on Augmentin twice a day for 10 days along with prednisone taper for 8 days  Patient will continue with Flonase at bedtime, saline rinses and gargling  She will contact me in a few days if symptoms are not improving significantly  There are no Patient Instructions on file for this visit  Discussed with the patient and all questioned fully answered  She will call me if any problems arise  M*Modal software was used to dictate this note  It may contain errors with dictating incorrect words/spelling  Please contact provider directly with any questions  Chief Complaint     Chief Complaint   Patient presents with    Cold Like Symptoms     Pt is here for sinus congestion, cough 1 + wk       History of Present Illness     Patient presents for evaluation of sinus symptoms  She has been experiencing significant sinus pressure, congestion, postnasal drip and sore throat within past 10 days  Patient recently flew back and forth [facility]  She is concerned about forthcoming travel to [facility] in a few days  Patient has developed ear pressure this morning  She denies fever, chest tightness or cough  No wheezing or colored mucus expectoration  Patient has tried multiple over-the-counter medications without relief of her symptoms  She is also concerned about swollen submandibular glands  Review of Systems   Review of Systems   Constitutional: Negative      HENT: Positive for congestion, ear pain, postnasal drip, sinus pressure, sinus pain and sore throat  Eyes: Negative  Respiratory: Negative  Cardiovascular: Negative  Neurological: Negative  Hematological: Negative  Psychiatric/Behavioral: Negative          Active Problem List     Patient Active Problem List   Diagnosis    Vitamin D deficiency    Vitamin B12 deficiency    Allergic rhinitis       Past Medical History:  Past Medical History:   Diagnosis Date    Bronchospasm     Chest pain     Conjunctivitis     Dizziness     Middle ear effusion, right     last assessed-2017    Onychomycosis     Vertigo        Past Surgical History:  Past Surgical History:   Procedure Laterality Date     SECTION, LOW TRANSVERSE      OTHER SURGICAL HISTORY      Angiography pulmonary-2007- no evidence of pulmonary embolus, no evidence of occult pulmonary disease, 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 just lateral to the costovertebral junction      OTHER SURGICAL HISTORY      ECG normal variant-2007       Family History:  Family History   Problem Relation Age of Onset    Skin cancer Mother         melanoma    Thyroid disease Mother     Coronary artery disease Father     Polymyalgia rheumatica Paternal Uncle        Social History:  Social History     Socioeconomic History    Marital status: /Civil Union     Spouse name: Not on file    Number of children: Not on file    Years of education: Not on file    Highest education level: Not on file   Occupational History     Comment: working full time   Social Needs    Financial resource strain: Not on file    Food insecurity:     Worry: Not on file     Inability: Not on file   [place] needs:     Medical: Not on file     Non-medical: Not on file   Tobacco Use    Smoking status: Never Smoker    Smokeless tobacco: Never Used   Substance and Sexual Activity    Alcohol use: Not Currently     Comment: social    Drug use: No    Sexual activity: Yes     Partners: Male     Birth control/protection: Condom   Lifestyle    Physical activity:     Days per week: Not on file     Minutes per session: Not on file    Stress: Not on file   Relationships    Social connections:     Talks on phone: Not on file     Gets together: Not on file     Attends Restoration service: Not on file     Active member of club or organization: Not on file     Attends meetings of clubs or organizations: Not on file     Relationship status: Not on file    Intimate partner violence:     Fear of current or ex partner: Not on file     Emotionally abused: Not on file     Physically abused: Not on file     Forced sexual activity: Not on file   Other Topics Concern    Not on file   Social History Narrative    Not on file     PHQ-9 Depression Screening    PHQ-9:    Frequency of the following problems over the past two weeks:       Little interest or pleasure in doing things:  0 - not at all  Feeling down, depressed, or hopeless:  0 - not at all  PHQ-2 Score:  0               Objective     Vitals:    07/31/19 1403   BP: 114/80   Pulse: 67   Resp: 16   Temp: 98 6 °F (37 °C)   TempSrc: Tympanic   SpO2: 100%   Weight: 62 4 kg (137 lb 9 6 oz)   Height: 5' 6" (1 676 m)     Wt Readings from Last 3 Encounters:   07/31/19 62 4 kg (137 lb 9 6 oz)   04/12/19 63 5 kg (140 lb)   04/30/18 64 3 kg (141 lb 12 8 oz)       Physical Exam   Constitutional: She is oriented to person, place, and time  She appears well-developed and well-nourished  HENT:   Head: Normocephalic and atraumatic  Right Ear: Tympanic membrane normal    Left Ear: Tympanic membrane normal    Nose: Mucosal edema present  Mouth/Throat: Uvula is midline  Posterior oropharyngeal erythema (Copious green postnasal drip) present  No oropharyngeal exudate  Eyes: Conjunctivae are normal    Neck: Neck supple  Cardiovascular: Normal rate, regular rhythm and normal heart sounds     Pulmonary/Chest: Effort normal and breath sounds normal  No respiratory distress  She has no wheezes  She has no rales  Lymphadenopathy:     She has cervical adenopathy ( submandibular bilaterally)  Neurological: She is alert and oriented to person, place, and time  She has normal reflexes  No cranial nerve deficit  Psychiatric: She has a normal mood and affect  Her behavior is normal    Nursing note and vitals reviewed        Pertinent Laboratory/Diagnostic Studies:  Lab Results   Component Value Date    BUN 16 05/04/2019    CREATININE 0 92 05/04/2019    CALCIUM 8 7 05/04/2019    K 4 2 05/04/2019    CO2 28 05/04/2019     05/04/2019     Lab Results   Component Value Date    ALT 19 05/04/2019    AST 18 05/04/2019    ALKPHOS 24 (L) 05/04/2019       Lab Results   Component Value Date    WBC 4 86 05/04/2019    HGB 12 8 05/04/2019    HCT 39 3 05/04/2019    MCV 99 (H) 05/04/2019     05/04/2019       No results found for: TSH    No results found for: CHOL  Lab Results   Component Value Date    TRIG 45 05/04/2019     Lab Results   Component Value Date    HDL 76 (H) 05/04/2019     Lab Results   Component Value Date    LDLCALC 53 05/04/2019     No results found for: HGBA1C    Results for orders placed or performed in visit on 05/04/19   CBC   Result Value Ref Range    WBC 4 86 4 31 - 10 16 Thousand/uL    RBC 3 97 3 81 - 5 12 Million/uL    Hemoglobin 12 8 11 5 - 15 4 g/dL    Hematocrit 39 3 34 8 - 46 1 %    MCV 99 (H) 82 - 98 fL    MCH 32 2 26 8 - 34 3 pg    MCHC 32 6 31 4 - 37 4 g/dL    RDW 12 0 11 6 - 15 1 %    Platelets 940 730 - 529 Thousands/uL    MPV 10 4 8 9 - 12 7 fL   Comprehensive metabolic panel   Result Value Ref Range    Sodium 138 136 - 145 mmol/L    Potassium 4 2 3 5 - 5 3 mmol/L    Chloride 106 100 - 108 mmol/L    CO2 28 21 - 32 mmol/L    ANION GAP 4 4 - 13 mmol/L    BUN 16 5 - 25 mg/dL    Creatinine 0 92 0 60 - 1 30 mg/dL    Glucose, Fasting 87 65 - 99 mg/dL    Calcium 8 7 8 3 - 10 1 mg/dL    AST 18 5 - 45 U/L    ALT 19 12 - 78 U/L Alkaline Phosphatase 24 (L) 46 - 116 U/L    Total Protein 7 1 6 4 - 8 2 g/dL    Albumin 3 9 3 5 - 5 0 g/dL    Total Bilirubin 1 01 (H) 0 20 - 1 00 mg/dL    eGFR 73 ml/min/1 73sq m   Lipid Panel with Direct LDL reflex   Result Value Ref Range    Cholesterol 138 50 - 200 mg/dL    Triglycerides 45 <=150 mg/dL    HDL, Direct 76 (H) 40 - 60 mg/dL    LDL Calculated 53 0 - 100 mg/dL   TSH, 3rd generation   Result Value Ref Range    TSH 3RD GENERATON 3 330 0 358 - 3 740 uIU/mL   Vitamin B12   Result Value Ref Range    Vitamin B-12 599 100 - 900 pg/mL   ALIN Screen w/ Reflex to Titer/Pattern   Result Value Ref Range    ALIN Negative Negative       No orders of the defined types were placed in this encounter        ALLERGIES:  Allergies   Allergen Reactions    Sulfa Antibiotics Hives       Current Medications     Current Outpatient Medications   Medication Sig Dispense Refill    albuterol (PROAIR HFA) 90 mcg/act inhaler Inhale 2 puffs every 4 (four) hours as needed for wheezing or shortness of breath (cough) 1 Inhaler 1    betamethasone valerate (VALISONE) 0 1 % lotion APPLY EVERY NIGHT AT BEDTIME TO SCALP SPOT, FOR UP TO 4 WEEKS, DO NOT USE ON FACE OR GENITALS  0    cetirizine (ZyrTEC) 10 mg tablet Take 10 mg by mouth daily      mometasone (NASONEX) 50 mcg/act nasal spray 2 sprays into each nostril daily      amoxicillin-clavulanate (AUGMENTIN) 875-125 mg per tablet Take 1 tablet by mouth 2 (two) times a day with meals for 10 days 20 tablet 0    predniSONE 10 mg tablet Take 40 mg (4 tabs) daily x 2 days; then 30 mg (3 tabs) daily x 2 days then 20 mg (2 tabs) daily x 2 days then 10 mg ( 1 tab) dailyx 2 days 20 tablet 0     Current Facility-Administered Medications   Medication Dose Route Frequency Provider Last Rate Last Dose    cyanocobalamin injection 1,000 mcg  1,000 mcg Intramuscular Q30 Days Shelle Halsted, MD   1,000 mcg at 08/29/18 1702    cyanocobalamin injection 1,000 mcg  1,000 mcg Intramuscular Q30 Days Shelle Halsted, MD   1,000 mcg at 09/12/18 1644    cyanocobalamin injection 1,000 mcg  1,000 mcg Intramuscular Q30 Days GONZALO Leyva   1,000 mcg at 10/17/18 1717    cyanocobalamin injection 1,000 mcg  1,000 mcg Intramuscular Q30 Days Shelle Halsted, MD   1,000 mcg at 12/19/18 1632    cyanocobalamin injection 1,000 mcg  1,000 mcg Intramuscular Q30 Days GONZALO Leyva   1,000 mcg at 06/05/19 1613    cyanocobalamin injection 1,000 mcg  1,000 mcg Intramuscular Q30 Days Shelle Halsted, MD   1,000 mcg at 07/05/19 1404       Medications Discontinued During This Encounter   Medication Reason    ergocalciferol (VITAMIN D2) 50,000 units        Health Maintenance     Health Maintenance   Topic Date Due    CRC Screening: Colonoscopy  1970    INFLUENZA VACCINE  07/01/2019    MAMMOGRAM  08/20/2019    Depression Screening PHQ  07/31/2020    BMI: Adult  07/31/2020    PAP SMEAR  04/30/2023    DTaP,Tdap,and Td Vaccines (2 - Td) 06/20/2023    Pneumococcal Vaccine: 65+ Years (1 of 2 - PCV13) 07/11/2033    Pneumococcal Vaccine: Pediatrics (0 to 5 Years) and At-Risk Patients (6 to 59 Years)  Aged Out    HEPATITIS B VACCINES  Aged Out       Immunization History   Administered Date(s) Administered     Influenza (IM) Preservative Free 11/01/2018    Influenza TIV (IM) 12/05/2009    Tdap 06/20/2013       Shelle Halsted, MD

## 2019-08-12 ENCOUNTER — CLINICAL SUPPORT (OUTPATIENT)
Dept: FAMILY MEDICINE CLINIC | Facility: CLINIC | Age: 49
End: 2019-08-12
Payer: COMMERCIAL

## 2019-08-12 DIAGNOSIS — E53.8 VITAMIN B12 DEFICIENCY: Primary | ICD-10-CM

## 2019-08-12 PROCEDURE — 96372 THER/PROPH/DIAG INJ SC/IM: CPT

## 2019-08-12 RX ORDER — CYANOCOBALAMIN 1000 UG/ML
1000 INJECTION INTRAMUSCULAR; SUBCUTANEOUS ONCE
Status: COMPLETED | OUTPATIENT
Start: 2019-08-12 | End: 2019-08-12

## 2019-08-12 RX ADMIN — CYANOCOBALAMIN 1000 MCG: 1000 INJECTION INTRAMUSCULAR; SUBCUTANEOUS at 17:30

## 2019-08-13 ENCOUNTER — OFFICE VISIT (OUTPATIENT)
Dept: FAMILY MEDICINE CLINIC | Facility: CLINIC | Age: 49
End: 2019-08-13
Payer: COMMERCIAL

## 2019-08-13 VITALS
WEIGHT: 138 LBS | OXYGEN SATURATION: 99 % | SYSTOLIC BLOOD PRESSURE: 110 MMHG | HEART RATE: 63 BPM | TEMPERATURE: 97.4 F | BODY MASS INDEX: 22.18 KG/M2 | HEIGHT: 66 IN | DIASTOLIC BLOOD PRESSURE: 70 MMHG | RESPIRATION RATE: 16 BRPM

## 2019-08-13 DIAGNOSIS — J30.89 ALLERGIC RHINITIS DUE TO OTHER ALLERGIC TRIGGER, UNSPECIFIED SEASONALITY: ICD-10-CM

## 2019-08-13 DIAGNOSIS — H92.01 EARACHE ON RIGHT: Primary | ICD-10-CM

## 2019-08-13 PROCEDURE — 99213 OFFICE O/P EST LOW 20 MIN: CPT | Performed by: FAMILY MEDICINE

## 2019-08-13 PROCEDURE — 3008F BODY MASS INDEX DOCD: CPT | Performed by: FAMILY MEDICINE

## 2019-08-13 RX ORDER — CIPROFLOXACIN AND DEXAMETHASONE 3; 1 MG/ML; MG/ML
4 SUSPENSION/ DROPS AURICULAR (OTIC) 2 TIMES DAILY
Qty: 7.5 ML | Refills: 0 | Status: SHIPPED | OUTPATIENT
Start: 2019-08-13 | End: 2020-02-14 | Stop reason: ALTCHOICE

## 2019-08-13 NOTE — PROGRESS NOTES
FAMILY PRACTICE OFFICE VISIT       NAME: Alexandr Pete  AGE: 44 y o  SEX: female       : 1970        MRN: [id number]        Assessment and Plan     Problem List Items Addressed This Visit        Respiratory    Allergic rhinitis      Other Visit Diagnoses     Earache on right    -  Primary    Relevant Medications    ciprofloxacin-dexamethasone (CIPRODEX) otic suspension        Patient presents for evaluation of right ear discomfort  Exam is essentially unremarkable  Patient was treated 2 weeks ago for acute sinusitis and has completed Augmentin and prednisone with complete resolution of symptoms  Her symptoms could represent eustachian tube dysfunction due to chronic seasonal allergies or mild tympanic membrane injury after jumping from zip line  Will hold off oral medications and will try  on Ciprodex 4 drops b i d  For 7 days  Patient will remain on regimen of Flonase and Allegra for seasonal allergies  I advised her to contact me in 4-5 days if her symptoms are not improving  There are no Patient Instructions on file for this visit  Discussed with the patient and all questioned fully answered  She will call me if any problems arise  M*Modal software was used to dictate this note  It may contain errors with dictating incorrect words/spelling  Please contact provider directly with any questions  Chief Complaint     Chief Complaint   Patient presents with    r ear clogged     x 4 days     Headache     x 3 days        History of Present Illness     Patient presents for evaluation of right earache  She was seen in the office 2 weeks ago for evaluation of acute sinus infection along with bilateral ear pressure and was treated with Augmentin and prednisone  Patient reports complete resolution of symptoms on this medication regimen    She just returned from vacation in [facility] and developed persistent right ear cloggingness and discomfort after jumping to the water from the zip line while on vacation  She denies symptoms of cold, cough, sore throat, sinus pressure, fever or colored mucus expectoration  No generalized headache, no ear drainage, no dizziness or lightheadedness      Review of Systems   Review of Systems   Constitutional: Negative  HENT: Positive for ear pain  Negative for congestion, ear discharge, postnasal drip and sinus pressure  Respiratory: Negative  Cardiovascular: Negative  Neurological: Negative for dizziness and headaches         Active Problem List     Patient Active Problem List   Diagnosis    Vitamin D deficiency    Vitamin B12 deficiency    Allergic rhinitis       Past Medical History:  Past Medical History:   Diagnosis Date    Bronchospasm     Chest pain     Conjunctivitis     Dizziness     Middle ear effusion, right     last assessed-2017    Onychomycosis     Vertigo        Past Surgical History:  Past Surgical History:   Procedure Laterality Date     SECTION, LOW TRANSVERSE      OTHER SURGICAL HISTORY      Angiography pulmonary-2007- no evidence of pulmonary embolus, no evidence of occult pulmonary disease, 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 just lateral to the costovertebral junction      OTHER SURGICAL HISTORY      ECG normal variant-2007       Family History:  Family History   Problem Relation Age of Onset    Skin cancer Mother         melanoma    Thyroid disease Mother     Coronary artery disease Father     Polymyalgia rheumatica Paternal Uncle        Social History:  Social History     Socioeconomic History    Marital status: /Civil Union     Spouse name: Not on file    Number of children: Not on file    Years of education: Not on file    Highest education level: Not on file   Occupational History     Comment: working full time   Social Needs    Financial resource strain: Not on file    Food insecurity:     Worry: Not on file     Inability: Not on file   [place] needs:     Medical: Not on file     Non-medical: Not on file   Tobacco Use    Smoking status: Never Smoker    Smokeless tobacco: Never Used   Substance and Sexual Activity    Alcohol use: Not Currently     Comment: social    Drug use: No    Sexual activity: Yes     Partners: Male     Birth control/protection: Condom   Lifestyle    Physical activity:     Days per week: Not on file     Minutes per session: Not on file    Stress: Not on file   Relationships    Social connections:     Talks on phone: Not on file     Gets together: Not on file     Attends Baptism service: Not on file     Active member of club or organization: Not on file     Attends meetings of clubs or organizations: Not on file     Relationship status: Not on file    Intimate partner violence:     Fear of current or ex partner: Not on file     Emotionally abused: Not on file     Physically abused: Not on file     Forced sexual activity: Not on file   Other Topics Concern    Not on file   Social History Narrative    Not on file       Objective     Vitals:    08/13/19 1136   BP: 110/70   BP Location: Left arm   Patient Position: Sitting   Cuff Size: Adult   Pulse: 63   Resp: 16   Temp: (!) 97 4 °F (36 3 °C)   TempSrc: Tympanic   SpO2: 99%   Weight: 62 6 kg (138 lb)   Height: 5' 6" (1 676 m)     Wt Readings from Last 3 Encounters:   08/13/19 62 6 kg (138 lb)   07/31/19 62 4 kg (137 lb 9 6 oz)   04/12/19 63 5 kg (140 lb)       Physical Exam   Constitutional: She is oriented to person, place, and time  She appears well-developed and well-nourished  HENT:   Head: Normocephalic  Right Ear: External ear and ear canal normal  Tympanic membrane is injected  Tympanic membrane is not perforated, not erythematous and not bulging  No middle ear effusion  Left Ear: Tympanic membrane, external ear and ear canal normal    Ears:    Nose: Mucosal edema present     Mouth/Throat: Oropharynx is clear and moist  No oropharyngeal exudate  Eyes: Pupils are equal, round, and reactive to light  Neck: Normal range of motion  Cardiovascular: Normal rate and regular rhythm  No murmur heard  Pulmonary/Chest: Effort normal    Musculoskeletal:   No TMJ laxity   Lymphadenopathy:     She has no cervical adenopathy  Neurological: She is alert and oriented to person, place, and time  Psychiatric: She has a normal mood and affect  Her behavior is normal    Nursing note and vitals reviewed        Pertinent Laboratory/Diagnostic Studies:  Lab Results   Component Value Date    BUN 16 05/04/2019    CREATININE 0 92 05/04/2019    CALCIUM 8 7 05/04/2019    K 4 2 05/04/2019    CO2 28 05/04/2019     05/04/2019     Lab Results   Component Value Date    ALT 19 05/04/2019    AST 18 05/04/2019    ALKPHOS 24 (L) 05/04/2019       Lab Results   Component Value Date    WBC 4 86 05/04/2019    HGB 12 8 05/04/2019    HCT 39 3 05/04/2019    MCV 99 (H) 05/04/2019     05/04/2019       No results found for: TSH    No results found for: CHOL  Lab Results   Component Value Date    TRIG 45 05/04/2019     Lab Results   Component Value Date    HDL 76 (H) 05/04/2019     Lab Results   Component Value Date    LDLCALC 53 05/04/2019     No results found for: HGBA1C    Results for orders placed or performed in visit on 05/04/19   CBC   Result Value Ref Range    WBC 4 86 4 31 - 10 16 Thousand/uL    RBC 3 97 3 81 - 5 12 Million/uL    Hemoglobin 12 8 11 5 - 15 4 g/dL    Hematocrit 39 3 34 8 - 46 1 %    MCV 99 (H) 82 - 98 fL    MCH 32 2 26 8 - 34 3 pg    MCHC 32 6 31 4 - 37 4 g/dL    RDW 12 0 11 6 - 15 1 %    Platelets 964 061 - 783 Thousands/uL    MPV 10 4 8 9 - 12 7 fL   Comprehensive metabolic panel   Result Value Ref Range    Sodium 138 136 - 145 mmol/L    Potassium 4 2 3 5 - 5 3 mmol/L    Chloride 106 100 - 108 mmol/L    CO2 28 21 - 32 mmol/L    ANION GAP 4 4 - 13 mmol/L    BUN 16 5 - 25 mg/dL    Creatinine 0 92 0 60 - 1 30 mg/dL    Glucose, Fasting 87 65 - 99 mg/dL    Calcium 8 7 8 3 - 10 1 mg/dL    AST 18 5 - 45 U/L    ALT 19 12 - 78 U/L    Alkaline Phosphatase 24 (L) 46 - 116 U/L    Total Protein 7 1 6 4 - 8 2 g/dL    Albumin 3 9 3 5 - 5 0 g/dL    Total Bilirubin 1 01 (H) 0 20 - 1 00 mg/dL    eGFR 73 ml/min/1 73sq m   Lipid Panel with Direct LDL reflex   Result Value Ref Range    Cholesterol 138 50 - 200 mg/dL    Triglycerides 45 <=150 mg/dL    HDL, Direct 76 (H) 40 - 60 mg/dL    LDL Calculated 53 0 - 100 mg/dL   TSH, 3rd generation   Result Value Ref Range    TSH 3RD GENERATON 3 330 0 358 - 3 740 uIU/mL   Vitamin B12   Result Value Ref Range    Vitamin B-12 599 100 - 900 pg/mL   ALIN Screen w/ Reflex to Titer/Pattern   Result Value Ref Range    ALIN Negative Negative       No orders of the defined types were placed in this encounter        ALLERGIES:  Allergies   Allergen Reactions    Sulfa Antibiotics Hives       Current Medications     Current Outpatient Medications   Medication Sig Dispense Refill    albuterol (PROAIR HFA) 90 mcg/act inhaler Inhale 2 puffs every 4 (four) hours as needed for wheezing or shortness of breath (cough) 1 Inhaler 1    betamethasone valerate (VALISONE) 0 1 % lotion APPLY EVERY NIGHT AT BEDTIME TO SCALP SPOT, FOR UP TO 4 WEEKS, DO NOT USE ON FACE OR GENITALS  0    cetirizine (ZyrTEC) 10 mg tablet Take 10 mg by mouth daily      mometasone (NASONEX) 50 mcg/act nasal spray 2 sprays into each nostril daily      ciprofloxacin-dexamethasone (CIPRODEX) otic suspension Administer 4 drops to the right ear 2 (two) times a day for 7 days 7 5 mL 0    predniSONE 10 mg tablet Take 40 mg (4 tabs) daily x 2 days; then 30 mg (3 tabs) daily x 2 days then 20 mg (2 tabs) daily x 2 days then 10 mg ( 1 tab) dailyx 2 days (Patient not taking: Reported on 8/13/2019) 20 tablet 0     Current Facility-Administered Medications   Medication Dose Route Frequency Provider Last Rate Last Dose    cyanocobalamin injection 1,000 mcg  1,000 mcg Intramuscular Q30 Days Hudson Sacks, MD   1,000 mcg at 08/29/18 1702    cyanocobalamin injection 1,000 mcg  1,000 mcg Intramuscular Q30 Days Hudson Sacks, MD   1,000 mcg at 09/12/18 1644    cyanocobalamin injection 1,000 mcg  1,000 mcg Intramuscular Q30 Days GONZALO Leyva   1,000 mcg at 10/17/18 1717    cyanocobalamin injection 1,000 mcg  1,000 mcg Intramuscular Q30 Days Hudson Sacks, MD   1,000 mcg at 12/19/18 1632    cyanocobalamin injection 1,000 mcg  1,000 mcg Intramuscular Q30 Days GONZALO Leyva   1,000 mcg at 06/05/19 1613    cyanocobalamin injection 1,000 mcg  1,000 mcg Intramuscular Q30 Days Hudson Sacks, MD   1,000 mcg at 07/05/19 1404       There are no discontinued medications      Health Maintenance     Health Maintenance   Topic Date Due    CRC Screening: Colonoscopy  1970    INFLUENZA VACCINE  07/01/2019    MAMMOGRAM  08/20/2019    Depression Screening PHQ  07/31/2020    BMI: Adult  08/13/2020    PAP SMEAR  04/30/2023    DTaP,Tdap,and Td Vaccines (2 - Td) 06/20/2023    Pneumococcal Vaccine: 65+ Years (1 of 2 - PCV13) 07/11/2033    Pneumococcal Vaccine: Pediatrics (0 to 5 Years) and At-Risk Patients (6 to 59 Years)  Aged Out    HEPATITIS B VACCINES  Aged [employer] History   Administered Date(s) Administered     Influenza (IM) Preservative Free 11/01/2018    Influenza TIV (IM) 12/05/2009    Tdap 06/20/2013       Hudson Sacks, MD

## 2019-09-12 ENCOUNTER — CLINICAL SUPPORT (OUTPATIENT)
Dept: FAMILY MEDICINE CLINIC | Facility: CLINIC | Age: 49
End: 2019-09-12
Payer: COMMERCIAL

## 2019-09-12 DIAGNOSIS — E53.8 VITAMIN B12 DEFICIENCY: Primary | ICD-10-CM

## 2019-09-12 RX ORDER — CYANOCOBALAMIN 1000 UG/ML
1000 INJECTION INTRAMUSCULAR; SUBCUTANEOUS
Status: SHIPPED | OUTPATIENT
Start: 2019-09-12

## 2019-09-12 RX ADMIN — CYANOCOBALAMIN 1000 MCG: 1000 INJECTION INTRAMUSCULAR; SUBCUTANEOUS at 16:25

## 2019-10-09 ENCOUNTER — CLINICAL SUPPORT (OUTPATIENT)
Dept: FAMILY MEDICINE CLINIC | Facility: CLINIC | Age: 49
End: 2019-10-09
Payer: COMMERCIAL

## 2019-10-09 DIAGNOSIS — E53.8 VITAMIN B 12 DEFICIENCY: Primary | ICD-10-CM

## 2019-10-09 PROCEDURE — 96372 THER/PROPH/DIAG INJ SC/IM: CPT

## 2019-10-09 RX ORDER — CYANOCOBALAMIN 1000 UG/ML
1000 INJECTION INTRAMUSCULAR; SUBCUTANEOUS
Status: DISCONTINUED | OUTPATIENT
Start: 2019-10-09 | End: 2022-03-28

## 2019-10-09 RX ADMIN — CYANOCOBALAMIN 1000 MCG: 1000 INJECTION INTRAMUSCULAR; SUBCUTANEOUS at 17:04

## 2019-11-13 ENCOUNTER — CLINICAL SUPPORT (OUTPATIENT)
Dept: FAMILY MEDICINE CLINIC | Facility: CLINIC | Age: 49
End: 2019-11-13
Payer: COMMERCIAL

## 2019-11-13 DIAGNOSIS — E53.8 VITAMIN B 12 DEFICIENCY: Primary | ICD-10-CM

## 2019-11-13 PROCEDURE — 96372 THER/PROPH/DIAG INJ SC/IM: CPT

## 2019-11-13 RX ORDER — CYANOCOBALAMIN 1000 UG/ML
1000 INJECTION INTRAMUSCULAR; SUBCUTANEOUS
Status: DISCONTINUED | OUTPATIENT
Start: 2019-11-13 | End: 2022-03-28

## 2019-11-13 RX ADMIN — CYANOCOBALAMIN 1000 MCG: 1000 INJECTION INTRAMUSCULAR; SUBCUTANEOUS at 16:17

## 2020-02-03 ENCOUNTER — OFFICE VISIT (OUTPATIENT)
Dept: FAMILY MEDICINE CLINIC | Facility: CLINIC | Age: 50
End: 2020-02-03
Payer: COMMERCIAL

## 2020-02-03 VITALS
RESPIRATION RATE: 18 BRPM | WEIGHT: 141.38 LBS | DIASTOLIC BLOOD PRESSURE: 80 MMHG | HEART RATE: 72 BPM | SYSTOLIC BLOOD PRESSURE: 110 MMHG | TEMPERATURE: 97.7 F | HEIGHT: 66 IN | OXYGEN SATURATION: 98 % | BODY MASS INDEX: 22.72 KG/M2

## 2020-02-03 DIAGNOSIS — N39.0 URINARY TRACT INFECTION WITH HEMATURIA, SITE UNSPECIFIED: ICD-10-CM

## 2020-02-03 DIAGNOSIS — R39.9 UTI SYMPTOMS: Primary | ICD-10-CM

## 2020-02-03 DIAGNOSIS — R31.9 URINARY TRACT INFECTION WITH HEMATURIA, SITE UNSPECIFIED: ICD-10-CM

## 2020-02-03 LAB
BACTERIA UR QL AUTO: ABNORMAL /HPF
BILIRUB UR QL STRIP: NEGATIVE
CLARITY UR: ABNORMAL
COLOR UR: YELLOW
GLUCOSE UR STRIP-MCNC: NEGATIVE MG/DL
HGB UR QL STRIP.AUTO: ABNORMAL
HYALINE CASTS #/AREA URNS LPF: ABNORMAL /LPF
KETONES UR STRIP-MCNC: NEGATIVE MG/DL
LEUKOCYTE ESTERASE UR QL STRIP: ABNORMAL
NITRITE UR QL STRIP: NEGATIVE
NON-SQ EPI CELLS URNS QL MICRO: ABNORMAL /HPF
PH UR STRIP.AUTO: 7 [PH]
PROT UR STRIP-MCNC: NEGATIVE MG/DL
RBC #/AREA URNS AUTO: ABNORMAL /HPF
SL AMB  POCT GLUCOSE, UA: ABNORMAL
SL AMB LEUKOCYTE ESTERASE,UA: ABNORMAL
SL AMB POCT BILIRUBIN,UA: ABNORMAL
SL AMB POCT BLOOD,UA: ABNORMAL
SL AMB POCT CLARITY,UA: CLEAR
SL AMB POCT COLOR,UA: YELLOW
SL AMB POCT KETONES,UA: ABNORMAL
SL AMB POCT NITRITE,UA: ABNORMAL
SL AMB POCT PH,UA: 5
SL AMB POCT SPECIFIC GRAVITY,UA: 1
SL AMB POCT URINE PROTEIN: ABNORMAL
SL AMB POCT UROBILINOGEN: 0.2
SP GR UR STRIP.AUTO: 1.01 (ref 1–1.03)
UROBILINOGEN UR QL STRIP.AUTO: 0.2 E.U./DL
WBC #/AREA URNS AUTO: ABNORMAL /HPF

## 2020-02-03 PROCEDURE — 87086 URINE CULTURE/COLONY COUNT: CPT | Performed by: FAMILY MEDICINE

## 2020-02-03 PROCEDURE — 81001 URINALYSIS AUTO W/SCOPE: CPT | Performed by: FAMILY MEDICINE

## 2020-02-03 PROCEDURE — 1036F TOBACCO NON-USER: CPT | Performed by: FAMILY MEDICINE

## 2020-02-03 PROCEDURE — 81003 URINALYSIS AUTO W/O SCOPE: CPT | Performed by: FAMILY MEDICINE

## 2020-02-03 PROCEDURE — 87181 SC STD AGAR DILUTION PER AGT: CPT | Performed by: FAMILY MEDICINE

## 2020-02-03 PROCEDURE — 87077 CULTURE AEROBIC IDENTIFY: CPT | Performed by: FAMILY MEDICINE

## 2020-02-03 PROCEDURE — 99213 OFFICE O/P EST LOW 20 MIN: CPT | Performed by: FAMILY MEDICINE

## 2020-02-03 PROCEDURE — 87186 SC STD MICRODIL/AGAR DIL: CPT | Performed by: FAMILY MEDICINE

## 2020-02-03 RX ORDER — NITROFURANTOIN 25; 75 MG/1; MG/1
100 CAPSULE ORAL 2 TIMES DAILY
Qty: 10 CAPSULE | Refills: 0 | Status: SHIPPED | OUTPATIENT
Start: 2020-02-03 | End: 2020-02-08

## 2020-02-03 NOTE — PROGRESS NOTES
FAMILY PRACTICE OFFICE VISIT       NAME: Cassi Haley  AGE: 44 y o  SEX: female       : 1970        MRN: [id number]    DATE: 2020  TIME: 8:57 AM    Assessment and Plan     Problem List Items Addressed This Visit        Other    UTI symptoms - Primary    Relevant Medications    nitrofurantoin (MACROBID) 100 mg capsule    Other Relevant Orders    Urine culture (Completed)    Urinalysis with microscopic (Completed)      Other Visit Diagnoses     Urinary tract infection with hematuria, site unspecified        Relevant Medications    nitrofurantoin (MACROBID) 100 mg capsule    Other Relevant Orders    POCT urine dip auto non-scope (Completed)        57-year-old female presents with urinary burning, urgency with POC urine dip positive for leukocytes, nitrites, microscopic blood  I will send urine for urinalysis and culture  Will start empiric treatment with nitrofurantoin  Have asked patient to increase water intake, may benefit from a so with cranberry capsules  If she should develop abdominal pain, flank pain, fevers, chills or anything else that is concerning, I would like her to let me know or go to the emergency room  I will notify her of her urine culture results when they are back  She is agreeable with this plan  There are no Patient Instructions on file for this visit  Chief Complaint     Chief Complaint   Patient presents with    urine burning     x 1 days today     fraquancy urine       History of Present Illness     HPI   Patient presents with urinary urgency, burning  Patient states she woke up morning with the symptoms  She also noted that when she wiped, saw blood in the toilet paper  Patient states her urine was more concentrated over the weekend  Has never had a UTI previously  Denies fevers, chills  lmp ? Has started drinking more water  Review of Systems   Review of Systems   Constitutional: Negative for chills and fever     Gastrointestinal: Negative for abdominal pain and constipation  Genitourinary: Positive for urgency  Negative for flank pain          Urinary burning       Active Problem List     Patient Active Problem List   Diagnosis    Vitamin D deficiency    Vitamin B12 deficiency    Allergic rhinitis    UTI symptoms       Past Medical History:  Past Medical History:   Diagnosis Date    Bronchospasm     Chest pain     Conjunctivitis     Dizziness     Middle ear effusion, right     last assessed-2017    Onychomycosis     Vertigo        Past Surgical History:  Past Surgical History:   Procedure Laterality Date     SECTION, LOW TRANSVERSE      OTHER SURGICAL HISTORY      Angiography pulmonary-2007- no evidence of pulmonary embolus, no evidence of occult pulmonary disease, 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 just lateral to the costovertebral junction      OTHER SURGICAL HISTORY      ECG normal variant-2007       Family History:  Family History   Problem Relation Age of Onset    Skin cancer Mother         melanoma    Thyroid disease Mother     Coronary artery disease Father     Polymyalgia rheumatica Paternal Uncle        Social History:  Social History     Socioeconomic History    Marital status: /Civil Union     Spouse name: Not on file    Number of children: Not on file    Years of education: Not on file    Highest education level: Not on file   Occupational History     Comment: working full time   Social Needs    Financial resource strain: Not on file    Food insecurity:     Worry: Not on file     Inability: Not on file   Huoshi needs:     Medical: Not on file     Non-medical: Not on file   Tobacco Use    Smoking status: Never Smoker    Smokeless tobacco: Never Used   Substance and Sexual Activity    Alcohol use: Not Currently     Comment: social    Drug use: No    Sexual activity: Yes     Partners: Male     Birth control/protection: Condom Lifestyle    Physical activity:     Days per week: Not on file     Minutes per session: Not on file    Stress: Not on file   Relationships    Social connections:     Talks on phone: Not on file     Gets together: Not on file     Attends [employer] service: Not on file     Active member of club or organization: Not on file     Attends meetings of clubs or organizations: Not on file     Relationship status: Not on file    Intimate partner violence:     Fear of current or ex partner: Not on file     Emotionally abused: Not on file     Physically abused: Not on file     Forced sexual activity: Not on file   Other Topics Concern    Not on file   Social History Narrative    Not on file     I have reviewed the patient's medical history in detail; there are no changes to the history as noted in the electronic medical record  Objective     Vitals:    02/03/20 1552   BP: 110/80   Pulse: 72   Resp: 18   Temp: 97 7 °F (36 5 °C)   SpO2: 98%     Wt Readings from Last 3 Encounters:   02/03/20 64 1 kg (141 lb 6 oz)   08/13/19 62 6 kg (138 lb)   07/31/19 62 4 kg (137 lb 9 6 oz)       Physical Exam   Constitutional: She appears well-developed and well-nourished  HENT:   Head: Normocephalic and atraumatic  Mouth/Throat: Oropharynx is clear and moist    Eyes: Pupils are equal, round, and reactive to light  Conjunctivae and EOM are normal    Neck: Normal range of motion  Neck supple  Cardiovascular: Normal rate, regular rhythm and normal heart sounds  Pulmonary/Chest: Effort normal and breath sounds normal    Abdominal: Soft  Bowel sounds are normal  She exhibits no distension  There is no tenderness  No CVA tenderness noted   Lymphadenopathy:     She has no cervical adenopathy  Neurological: She is alert  Nursing note and vitals reviewed        Pertinent Laboratory/Diagnostic Studies:  Lab Results   Component Value Date    BUN 16 05/04/2019    CREATININE 0 92 05/04/2019    CALCIUM 8 7 05/04/2019    K 4 2 05/04/2019    CO2 28 05/04/2019     05/04/2019     Lab Results   Component Value Date    ALT 19 05/04/2019    AST 18 05/04/2019    ALKPHOS 24 (L) 05/04/2019       Lab Results   Component Value Date    WBC 4 86 05/04/2019    HGB 12 8 05/04/2019    HCT 39 3 05/04/2019    MCV 99 (H) 05/04/2019     05/04/2019       No results found for: TSH    No results found for: CHOL  Lab Results   Component Value Date    TRIG 45 05/04/2019     Lab Results   Component Value Date    HDL 76 (H) 05/04/2019     Lab Results   Component Value Date    LDLCALC 53 05/04/2019     No results found for: HGBA1C    Results for orders placed or performed in visit on 02/03/20   Urine culture   Result Value Ref Range    Urine Culture >100,000 cfu/ml Gram Negative Danial Enteric Like (A)    Urinalysis with microscopic   Result Value Ref Range    Clarity, UA Cloudy     Color, UA Yellow     Specific Gravity, UA 1 008 1 003 - 1 030    pH, UA 7 0 4 5, 5 0, 5 5, 6 0, 6 5, 7 0, 7 5, 8 0    Glucose, UA Negative Negative mg/dl    Ketones, UA Negative Negative mg/dl    Blood, UA Small (A) Negative    Protein, UA Negative Negative mg/dl    Nitrite, UA Negative Negative    Bilirubin, UA Negative Negative    Urobilinogen, UA 0 2 0 2, 1 0 E U /dl E U /dl    Leukocytes, UA Moderate (A) Negative    WBC, UA 30-50 (A) None Seen, 0-5, 5-55, 5-65 /hpf    RBC, UA 4-10 (A) None Seen, 0-5 /hpf    Hyaline Casts, UA 3-5 (A) None Seen /lpf    Bacteria, UA Innumerable (A) None Seen, Occasional /hpf    Epithelial Cells None Seen None Seen, Occasional /hpf   POCT urine dip auto non-scope   Result Value Ref Range     COLOR,UA yellow     CLARITY,UA clear     SPECIFIC GRAVITY,UA 1 000      PH,UA 5 0     LEUKOCYTE ESTERASE,UA large+++     NITRITE,UA possitive     GLUCOSE, UA neg     KETONES,UA neg     BILIRUBIN,UA neg     BLOOD,UA moderate     POCT URINE PROTEIN neg     SL AMB POCT UROBILINOGEN 0 2        Orders Placed This Encounter   Procedures    Urine culture    Urinalysis with microscopic    POCT urine dip auto non-scope       ALLERGIES:  Allergies   Allergen Reactions    Sulfa Antibiotics Hives       Current Medications     Current Outpatient Medications   Medication Sig Dispense Refill    albuterol (PROAIR HFA) 90 mcg/act inhaler Inhale 2 puffs every 4 (four) hours as needed for wheezing or shortness of breath (cough) 1 Inhaler 1    cetirizine (ZyrTEC) 10 mg tablet Take 10 mg by mouth daily      mometasone (NASONEX) 50 mcg/act nasal spray 2 sprays into each nostril daily      ciprofloxacin-dexamethasone (CIPRODEX) otic suspension Administer 4 drops to the right ear 2 (two) times a day for 7 days 7 5 mL 0    nitrofurantoin (MACROBID) 100 mg capsule Take 1 capsule (100 mg total) by mouth 2 (two) times a day for 5 days 10 capsule 0     Current Facility-Administered Medications   Medication Dose Route Frequency Provider Last Rate Last Dose    cyanocobalamin injection 1,000 mcg  1,000 mcg Intramuscular Q30 Days Yolanda Joseph MD   1,000 mcg at 08/29/18 1702    cyanocobalamin injection 1,000 mcg  1,000 mcg Intramuscular Q30 Days Yolanda Joseph MD   1,000 mcg at 09/12/18 1644    cyanocobalamin injection 1,000 mcg  1,000 mcg Intramuscular Q30 Days GONZALO Leyva   1,000 mcg at 10/17/18 1717    cyanocobalamin injection 1,000 mcg  1,000 mcg Intramuscular Q30 Days Yolanda Joseph MD   1,000 mcg at 12/19/18 1632    cyanocobalamin injection 1,000 mcg  1,000 mcg Intramuscular Q30 Days GONZALO Leyva   1,000 mcg at 06/05/19 1613    cyanocobalamin injection 1,000 mcg  1,000 mcg Intramuscular Q30 Days Yolanda Joseph MD   1,000 mcg at 07/05/19 1404    cyanocobalamin injection 1,000 mcg  1,000 mcg Intramuscular Q30 Days Yolanda Joseph MD   1,000 mcg at 09/12/19 1625    cyanocobalamin injection 1,000 mcg  1,000 mcg Intramuscular Q30 Days Yolanda Joseph MD   1,000 mcg at 10/09/19 1704    cyanocobalamin injection 1,000 mcg  1,000 mcg Intramuscular Q30 Days GONZALO Mera   1,000 mcg at 11/13/19 1617         Health Maintenance     Health Maintenance   Topic Date Due    CRC Screening: Colonoscopy  1970    HIV Screening  07/11/1983    Annual Physical  07/11/1986    Influenza Vaccine  07/01/2019    Depression Screening PHQ  07/31/2020    MAMMOGRAM  08/30/2020    BMI: Adult  02/03/2021    Cervical Cancer Screening  04/30/2023    DTaP,Tdap,and Td Vaccines (2 - Td) 06/20/2023    Pneumococcal Vaccine: 65+ Years (1 of 2 - PCV13) 07/11/2033    Pneumococcal Vaccine: Pediatrics (0 to 5 Years) and At-Risk Patients (6 to 59 Years)  Aged Out    HIB Vaccine  Aged Out    Hepatitis B Vaccine  Aged Out    IPV Vaccine  Aged Out    Hepatitis A Vaccine  Aged Out    Meningococcal ACWY Vaccine  Aged Out    HPV Vaccine  Aged [employer] History   Administered Date(s) Administered     Influenza (IM) Preservative Free 11/01/2018    Influenza TIV (IM) 12/05/2009    Tdap 06/20/2013       Ambreen Snyder MD

## 2020-02-05 PROBLEM — R39.9 UTI SYMPTOMS: Status: ACTIVE | Noted: 2020-02-05

## 2020-02-06 ENCOUNTER — TELEPHONE (OUTPATIENT)
Dept: FAMILY MEDICINE CLINIC | Facility: CLINIC | Age: 50
End: 2020-02-06

## 2020-02-06 NOTE — RESULT ENCOUNTER NOTE
Please let patient know that her final urine culture did grow bacteria  I would like her to complete the entire course of the antibiotic that she is taking  I would like her to drop-off a urine sample 1 week after she has completed her antibiotics to ensure that there was no further bacteria and microscopic blood in her urine  Can you make sure it is a clean-catch sample?   Thank you

## 2020-02-06 NOTE — TELEPHONE ENCOUNTER
----- Message from Bailey Lara MD sent at 2/6/2020 11:34 AM EST -----  Please let patient know that her final urine culture did grow bacteria  I would like her to complete the entire course of the antibiotic that she is taking  I would like her to drop-off a urine sample 1 week after she has completed her antibiotics to ensure that there was no further bacteria and microscopic blood in her urine  Can you make sure it is a clean-catch sample?   Thank you

## 2020-02-07 ENCOUNTER — TELEPHONE (OUTPATIENT)
Dept: FAMILY MEDICINE CLINIC | Facility: CLINIC | Age: 50
End: 2020-02-07

## 2020-02-07 LAB
BACTERIA UR CULT: ABNORMAL
BACTERIA UR CULT: ABNORMAL

## 2020-02-07 NOTE — TELEPHONE ENCOUNTER
----- Message from Rose Iverson MD sent at 2/6/2020 11:34 AM EST -----  Please let patient know that her final urine culture did grow bacteria  I would like her to complete the entire course of the antibiotic that she is taking  I would like her to drop-off a urine sample 1 week after she has completed her antibiotics to ensure that there was no further bacteria and microscopic blood in her urine  Can you make sure it is a clean-catch sample?   Thank you

## 2020-02-10 NOTE — TELEPHONE ENCOUNTER
Patient called and stated that she woke up this morning and had burning again  She finished her antibiotic On Sat morning  She would like to bring in another sample today  She states she will be in this afternoon  If this is not ok then please call her  If she doesn't hear from anyone then she will be here this afternoon  Please leave a message for patient if needed

## 2020-02-11 ENCOUNTER — TELEPHONE (OUTPATIENT)
Dept: FAMILY MEDICINE CLINIC | Facility: CLINIC | Age: 50
End: 2020-02-11

## 2020-02-11 ENCOUNTER — CLINICAL SUPPORT (OUTPATIENT)
Dept: FAMILY MEDICINE CLINIC | Facility: CLINIC | Age: 50
End: 2020-02-11
Payer: COMMERCIAL

## 2020-02-11 DIAGNOSIS — R39.9 UTI SYMPTOMS: Primary | ICD-10-CM

## 2020-02-11 LAB
BACTERIA UR QL AUTO: ABNORMAL /HPF
BILIRUB UR QL STRIP: NEGATIVE
CLARITY UR: ABNORMAL
COLOR UR: YELLOW
GLUCOSE UR STRIP-MCNC: NEGATIVE MG/DL
HGB UR QL STRIP.AUTO: ABNORMAL
HYALINE CASTS #/AREA URNS LPF: ABNORMAL /LPF
KETONES UR STRIP-MCNC: NEGATIVE MG/DL
LEUKOCYTE ESTERASE UR QL STRIP: ABNORMAL
NITRITE UR QL STRIP: NEGATIVE
NON-SQ EPI CELLS URNS QL MICRO: ABNORMAL /HPF
PH UR STRIP.AUTO: 7 [PH]
PROT UR STRIP-MCNC: NEGATIVE MG/DL
RBC #/AREA URNS AUTO: ABNORMAL /HPF
SL AMB  POCT GLUCOSE, UA: NORMAL
SL AMB LEUKOCYTE ESTERASE,UA: NORMAL
SL AMB POCT BILIRUBIN,UA: NORMAL
SL AMB POCT BLOOD,UA: NORMAL
SL AMB POCT CLARITY,UA: CLEAR
SL AMB POCT COLOR,UA: NORMAL
SL AMB POCT KETONES,UA: NORMAL
SL AMB POCT NITRITE,UA: NORMAL
SL AMB POCT PH,UA: 6.5
SL AMB POCT SPECIFIC GRAVITY,UA: 1
SL AMB POCT URINE PROTEIN: NORMAL
SL AMB POCT UROBILINOGEN: 0.2
SP GR UR STRIP.AUTO: 1 (ref 1–1.03)
UROBILINOGEN UR QL STRIP.AUTO: 1 E.U./DL
WBC #/AREA URNS AUTO: ABNORMAL /HPF

## 2020-02-11 PROCEDURE — 87186 SC STD MICRODIL/AGAR DIL: CPT | Performed by: FAMILY MEDICINE

## 2020-02-11 PROCEDURE — 81003 URINALYSIS AUTO W/O SCOPE: CPT

## 2020-02-11 PROCEDURE — 87086 URINE CULTURE/COLONY COUNT: CPT | Performed by: FAMILY MEDICINE

## 2020-02-11 PROCEDURE — 87181 SC STD AGAR DILUTION PER AGT: CPT | Performed by: FAMILY MEDICINE

## 2020-02-11 PROCEDURE — 87077 CULTURE AEROBIC IDENTIFY: CPT | Performed by: FAMILY MEDICINE

## 2020-02-11 PROCEDURE — 99211 OFF/OP EST MAY X REQ PHY/QHP: CPT

## 2020-02-11 PROCEDURE — 81001 URINALYSIS AUTO W/SCOPE: CPT

## 2020-02-11 RX ORDER — NITROFURANTOIN 25; 75 MG/1; MG/1
100 CAPSULE ORAL 2 TIMES DAILY
Qty: 14 CAPSULE | Refills: 0 | Status: SHIPPED | OUTPATIENT
Start: 2020-02-11 | End: 2020-02-14

## 2020-02-11 RX ORDER — GRANULES FOR ORAL 3 G/1
3 POWDER ORAL ONCE
Qty: 3 G | Refills: 0 | Status: SHIPPED | OUTPATIENT
Start: 2020-02-11 | End: 2020-02-11

## 2020-02-11 NOTE — TELEPHONE ENCOUNTER
[facility] in [place] called stating patient's insurance will not cover the Fosfomycin, I did ask for an alternative and they didn't receive any from the insurance company  Can you please resend another RX for a different medication for patient

## 2020-02-11 NOTE — TELEPHONE ENCOUNTER
----- Message from Shimon Arreola MD sent at 2/11/2020 10:51 AM EST -----  Please let patient know that her urine dip shows mild bacteria and microscopic blood  We will send this out for urinalysis and culture  I will notify her when her urine culture results are back  If she is having worsening symptoms, I can start her on another antibiotic  Can you ask her?

## 2020-02-11 NOTE — TELEPHONE ENCOUNTER
Please let patient know that her antibiotics prescribed is not covered by insurance  I will call in the same antibiotic that she was on previously until we get the urine culture results back  If she should have any symptoms of fevers, chills, back pain or anything else that is concerning, I would like her to let me know    Thank you

## 2020-02-11 NOTE — RESULT ENCOUNTER NOTE
Please let patient know that her urine dip shows mild bacteria and microscopic blood  We will send this out for urinalysis and culture  I will notify her when her urine culture results are back  If she is having worsening symptoms, I can start her on another antibiotic  Can you ask her?

## 2020-02-11 NOTE — TELEPHONE ENCOUNTER
Pt came in this morning to give a urine sample, would like to know the results and would like to know if there is anything that she can have, even if she can get it over the counter, to relieve some pressure and pain

## 2020-02-13 ENCOUNTER — DOCUMENTATION (OUTPATIENT)
Dept: FAMILY MEDICINE CLINIC | Facility: CLINIC | Age: 50
End: 2020-02-13

## 2020-02-13 ENCOUNTER — TELEPHONE (OUTPATIENT)
Dept: FAMILY MEDICINE CLINIC | Facility: CLINIC | Age: 50
End: 2020-02-13

## 2020-02-13 ENCOUNTER — HOSPITAL ENCOUNTER (EMERGENCY)
Facility: HOSPITAL | Age: 50
Discharge: HOME | End: 2020-02-13
Attending: EMERGENCY MEDICINE
Payer: COMMERCIAL

## 2020-02-13 VITALS
DIASTOLIC BLOOD PRESSURE: 71 MMHG | RESPIRATION RATE: 19 BRPM | BODY MASS INDEX: 22.6 KG/M2 | OXYGEN SATURATION: 98 % | WEIGHT: 140 LBS | HEART RATE: 91 BPM | SYSTOLIC BLOOD PRESSURE: 151 MMHG | TEMPERATURE: 99.1 F

## 2020-02-13 DIAGNOSIS — N39.0 URINARY TRACT INFECTION WITHOUT HEMATURIA, SITE UNSPECIFIED: Primary | ICD-10-CM

## 2020-02-13 DIAGNOSIS — R39.9 UTI SYMPTOMS: Primary | ICD-10-CM

## 2020-02-13 DIAGNOSIS — R30.0 DYSURIA: Primary | ICD-10-CM

## 2020-02-13 DIAGNOSIS — M54.50 LOW BACK PAIN, UNSPECIFIED BACK PAIN LATERALITY, UNSPECIFIED CHRONICITY, UNSPECIFIED WHETHER SCIATICA PRESENT: ICD-10-CM

## 2020-02-13 DIAGNOSIS — R50.9 FEVER, UNSPECIFIED FEVER CAUSE: ICD-10-CM

## 2020-02-13 LAB
BACTERIA UR QL AUTO: ABNORMAL /HPF
BILIRUB UR QL STRIP: NEGATIVE
CLARITY UR: CLEAR
COLOR UR: YELLOW
GLUCOSE UR STRIP-MCNC: NEGATIVE MG/DL
HGB UR QL STRIP.AUTO: ABNORMAL
KETONES UR STRIP-MCNC: ABNORMAL MG/DL
LEUKOCYTE ESTERASE UR QL STRIP: ABNORMAL
NITRITE UR QL STRIP: NEGATIVE
NON-SQ EPI CELLS URNS QL MICRO: ABNORMAL /HPF
PH UR STRIP.AUTO: 6 [PH] (ref 4.5–8)
PROT UR STRIP-MCNC: NEGATIVE MG/DL
RBC #/AREA URNS AUTO: ABNORMAL /HPF
SP GR UR STRIP.AUTO: 1.01 (ref 1–1.03)
UROBILINOGEN UR QL STRIP.AUTO: 0.2 E.U./DL
WBC #/AREA URNS AUTO: ABNORMAL /HPF

## 2020-02-13 PROCEDURE — 81001 URINALYSIS AUTO W/SCOPE: CPT

## 2020-02-13 PROCEDURE — 99283 EMERGENCY DEPT VISIT LOW MDM: CPT

## 2020-02-13 PROCEDURE — 99284 EMERGENCY DEPT VISIT MOD MDM: CPT | Performed by: EMERGENCY MEDICINE

## 2020-02-13 PROCEDURE — 87086 URINE CULTURE/COLONY COUNT: CPT

## 2020-02-13 RX ORDER — PHENAZOPYRIDINE HYDROCHLORIDE 200 MG/1
200 TABLET, FILM COATED ORAL 3 TIMES DAILY
Qty: 6 TABLET | Refills: 0 | Status: SHIPPED | OUTPATIENT
Start: 2020-02-13 | End: 2020-02-14

## 2020-02-13 RX ORDER — GRANULES FOR ORAL 3 G/1
3 POWDER ORAL ONCE
Qty: 3 G | Refills: 0 | OUTPATIENT
Start: 2020-02-13 | End: 2020-02-14 | Stop reason: SDUPTHER

## 2020-02-13 NOTE — TELEPHONE ENCOUNTER
Pt called stating she did start with a fever of 101 last night  Would like to know if results are back from Urine culture and what can be done to help her  Please advise  Okay to leave a message she did go to work today

## 2020-02-13 NOTE — RESULT ENCOUNTER NOTE
Spoke with patient regarding culture results  It appears that the fosfomycin is not sensitive with the 2nd urine culture sent  Patient has had a temperature as well as low back pain  I have instructed her to go the emergency room  Have notified ER by placing ADT order  Patient is agreeable and is headed to [facility] ER

## 2020-02-13 NOTE — TELEPHONE ENCOUNTER
LM for patient to return my call with regard to antibiotic and pending urine culture  Per Dr Reyna Horta, Monurol (Fosfomycin) 3 gram PO x 1 dose with or without food called in to [facility], [place] on [address] ([phone]) and verified they have medication in stock and in date and covered by insurance, co pay approx $60      Patient needs to be instructed to  medication at this different pharmacy due to limited availability, take medication ASAP today, and proceed to ER if fever persists or if symptoms continue including flank pain, burning with urination, frequency, etc      Dr Reyna Horta will follow-up once the urine culture results are back  They are pending as of now

## 2020-02-13 NOTE — TELEPHONE ENCOUNTER
Patient returned call  Instructed to  medication, dissolve packet in 3-4 oz of cold or lukewarm water - not hot and take dose tonight  Instructed to continue motrin every 6 hours for fever and pain and call with an update tomorrow morning and perhaps her culture results would be complete at that point  Per Dr Socorro Elizondo, patient instructed to go to the ER if fever persists or if symptoms worsen for possible IV antibiotics  Patient expressed understanding and agreeable to plan  Will call with update tomorrow

## 2020-02-13 NOTE — PROGRESS NOTES
Demographics  Interaction Method: Phone    Topic(s) Addressed  Other    Intervention(s) Made  Pharmacologic: Medication Initiation    Comments: MedInit: Fosfomycin    Tool(s) Used  Not Applicable    Time Spent in Direct Patient Care Activities and Care Coordination: 0-15 Minutes    Recommendation(s) Accepted by the Patient/Caregiver:  All Accepted

## 2020-02-14 ENCOUNTER — TELEPHONE (OUTPATIENT)
Dept: FAMILY MEDICINE CLINIC | Facility: CLINIC | Age: 50
End: 2020-02-14

## 2020-02-14 ENCOUNTER — OFFICE VISIT (OUTPATIENT)
Dept: FAMILY MEDICINE CLINIC | Facility: CLINIC | Age: 50
End: 2020-02-14
Payer: COMMERCIAL

## 2020-02-14 VITALS
TEMPERATURE: 96.9 F | HEART RATE: 64 BPM | RESPIRATION RATE: 18 BRPM | WEIGHT: 142 LBS | BODY MASS INDEX: 22.82 KG/M2 | HEIGHT: 66 IN

## 2020-02-14 DIAGNOSIS — Z16.12 URINARY TRACT INFECTION DUE TO EXTENDED-SPECTRUM BETA LACTAMASE (ESBL) PRODUCING ESCHERICHIA COLI: ICD-10-CM

## 2020-02-14 DIAGNOSIS — R30.0 DYSURIA: Primary | ICD-10-CM

## 2020-02-14 DIAGNOSIS — L30.9 DERMATITIS: ICD-10-CM

## 2020-02-14 DIAGNOSIS — R50.9 FEVER, UNSPECIFIED FEVER CAUSE: ICD-10-CM

## 2020-02-14 DIAGNOSIS — B96.29 URINARY TRACT INFECTION DUE TO EXTENDED-SPECTRUM BETA LACTAMASE (ESBL) PRODUCING ESCHERICHIA COLI: ICD-10-CM

## 2020-02-14 DIAGNOSIS — N39.0 URINARY TRACT INFECTION DUE TO EXTENDED-SPECTRUM BETA LACTAMASE (ESBL) PRODUCING ESCHERICHIA COLI: ICD-10-CM

## 2020-02-14 LAB
BACTERIA UR CULT: ABNORMAL
FLUAV RNA NPH QL NAA+PROBE: NORMAL
FLUBV RNA NPH QL NAA+PROBE: NORMAL
RSV RNA NPH QL NAA+PROBE: NORMAL

## 2020-02-14 PROCEDURE — 87070 CULTURE OTHR SPECIMN AEROBIC: CPT | Performed by: FAMILY MEDICINE

## 2020-02-14 PROCEDURE — 3008F BODY MASS INDEX DOCD: CPT | Performed by: FAMILY MEDICINE

## 2020-02-14 PROCEDURE — 1036F TOBACCO NON-USER: CPT | Performed by: FAMILY MEDICINE

## 2020-02-14 PROCEDURE — 87631 RESP VIRUS 3-5 TARGETS: CPT | Performed by: FAMILY MEDICINE

## 2020-02-14 PROCEDURE — 99214 OFFICE O/P EST MOD 30 MIN: CPT | Performed by: FAMILY MEDICINE

## 2020-02-14 RX ORDER — FOSFOMYCIN TROMETHAMINE 3 G/1
POWDER ORAL
COMMUNITY
Start: 2020-02-13 | End: 2020-02-14

## 2020-02-14 NOTE — TELEPHONE ENCOUNTER
Patient called stating she did go to the ER last night for her UTI, they put her on Phenazopyridine HCI and now patient has a rash on her hand, also is still running a fever of 104  She said she doesn't know what to do  Please advise patient of what she should do at [phone]

## 2020-02-14 NOTE — TELEPHONE ENCOUNTER
Sent to [facility] for review, pt has appt today at 11:45am   Spoke with pt, clarified temp 100 4 down with Motrin  UTI symptoms are decreased

## 2020-02-14 NOTE — ED PROVIDER NOTES
History  Chief Complaint   Patient presents with    Possible UTI     DX with UTI  took ABX; initially s/s cleared up but then came back again  Pt complains of fevers and continued burning urination  Patient presents for evaluation of dysuria  She states that last week she was diagnosed with urinary tract infection and took a 5 day course of nitrofurantoin with improvement of her symptoms  Few days later she had onset of dysuria once again, saw her PCP, and was prescribed a another course of antibiotics  Over the past 2 days she states that her burning has improved  She also endorses having a fever of 101 last night and she has been taking Tylenol today to help control that  She was then contacted by her primary care physician who instructed her to come into the emergency department as her urine culture had shown that the bacteria would be resistant to the antibiotic that she is on  She denies any nausea/vomiting, abdominal pain, flank pain, hematuria, vaginal discharge  Prior to Admission Medications   Prescriptions Last Dose Informant Patient Reported? Taking?    albuterol (PROAIR HFA) 90 mcg/act inhaler   No No   Sig: Inhale 2 puffs every 4 (four) hours as needed for wheezing or shortness of breath (cough)   cetirizine (ZyrTEC) 10 mg tablet   Yes No   Sig: Take 10 mg by mouth daily   ciprofloxacin-dexamethasone (CIPRODEX) otic suspension   No No   Sig: Administer 4 drops to the right ear 2 (two) times a day for 7 days   fosfomycin (MONUROL) 3 g   No No   Sig: Take 3 g by mouth once for 1 dose   mometasone (NASONEX) 50 mcg/act nasal spray   Yes No   Si sprays into each nostril daily   nitrofurantoin (MACROBID) 100 mg capsule 2020 at Unknown time  No Yes   Sig: Take 1 capsule (100 mg total) by mouth 2 (two) times a day for 7 days      Facility-Administered Medications Last Administration Doses Remaining   cyanocobalamin injection 1,000 mcg 2018  5:02 PM    cyanocobalamin injection 1,000 mcg 2018  4:44 PM    cyanocobalamin injection 1,000 mcg 10/17/2018  5:17 PM    cyanocobalamin injection 1,000 mcg 2018  4:32 PM    cyanocobalamin injection 1,000 mcg 2019  4:13 PM    cyanocobalamin injection 1,000 mcg 2019  2:04 PM    cyanocobalamin injection 1,000 mcg 2019  4:25 PM    cyanocobalamin injection 1,000 mcg 10/9/2019  5:04 PM    cyanocobalamin injection 1,000 mcg 2019  4:17 PM           Past Medical History:   Diagnosis Date    Bronchospasm     Chest pain     Conjunctivitis     Dizziness     Middle ear effusion, right     last assessed-2017    Onychomycosis     Vertigo        Past Surgical History:   Procedure Laterality Date     SECTION, LOW TRANSVERSE      OTHER SURGICAL HISTORY      Angiography pulmonary-2007- no evidence of pulmonary embolus, no evidence of occult pulmonary disease, 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 just lateral to the costovertebral junction      OTHER SURGICAL HISTORY      ECG normal variant-2007       Family History   Problem Relation Age of Onset    Skin cancer Mother         melanoma    Thyroid disease Mother     Coronary artery disease Father     Polymyalgia rheumatica Paternal Uncle      I have reviewed and agree with the history as documented  Social History     Tobacco Use    Smoking status: Never Smoker    Smokeless tobacco: Never Used   Substance Use Topics    Alcohol use: Not Currently     Comment: social    Drug use: No        Review of Systems   Constitutional: Positive for fever  Negative for chills, diaphoresis and fatigue  Respiratory: Negative for cough and shortness of breath  Cardiovascular: Negative for chest pain and palpitations  Gastrointestinal: Negative for abdominal distention, abdominal pain, constipation, diarrhea, nausea and vomiting  Genitourinary: Positive for dysuria   Negative for flank pain, frequency and hematuria  Musculoskeletal: Negative for arthralgias, myalgias and neck pain  Neurological: Negative for dizziness, syncope, light-headedness and headaches  All other systems reviewed and are negative  Physical Exam  ED Triage Vitals [02/13/20 1759]   Temperature Pulse Respirations Blood Pressure SpO2   99 1 °F (37 3 °C) 91 19 151/71 98 %      Temp Source Heart Rate Source Patient Position - Orthostatic VS BP Location FiO2 (%)   Oral Monitor -- -- --      Pain Score       2             Orthostatic Vital Signs  Vitals:    02/13/20 1759   BP: 151/71   Pulse: 91       Physical Exam   Constitutional: She is oriented to person, place, and time  She appears well-nourished  No distress  HENT:   Head: Normocephalic and atraumatic  Right Ear: External ear normal    Left Ear: External ear normal    Mouth/Throat: Oropharynx is clear and moist    Eyes: Pupils are equal, round, and reactive to light  Conjunctivae and EOM are normal  Right eye exhibits no discharge  Left eye exhibits no discharge  No scleral icterus  Neck: Normal range of motion  Neck supple  No JVD present  Cardiovascular: Normal rate, regular rhythm, normal heart sounds and intact distal pulses  Exam reveals no gallop and no friction rub  No murmur heard  Pulmonary/Chest: Effort normal and breath sounds normal  No respiratory distress  She has no wheezes  She has no rales  Abdominal: Soft  Bowel sounds are normal  She exhibits no distension and no mass  There is no tenderness  There is no guarding  No CVA tenderness   Musculoskeletal: Normal range of motion  She exhibits no tenderness or deformity  Neurological: She is alert and oriented to person, place, and time  No cranial nerve deficit or sensory deficit  She exhibits normal muscle tone  Coordination normal    Skin: Skin is warm  She is not diaphoretic  Psychiatric: She has a normal mood and affect   Her behavior is normal  Judgment and thought content normal    Vitals reviewed  ED Medications  Medications - No data to display    Diagnostic Studies  Results Reviewed     Procedure Component Value Units Date/Time    Urine Microscopic [[id number]]  (Abnormal) Collected:  02/13/20 1826    Lab Status:  Final result Specimen:  Urine, Clean Catch Updated:  02/13/20 1930     RBC, UA 2-4 /hpf      WBC, UA 10-20 /hpf      Epithelial Cells Moderate /hpf      Bacteria, UA Occasional /hpf     Urine culture [[id number]] Collected:  02/13/20 1826    Lab Status: In process Specimen:  Urine, Clean Catch Updated:  02/13/20 1930    Urine Macroscopic, POC [[id number]]  (Abnormal) Collected:  02/13/20 1826    Lab Status:  Final result Specimen:  Urine Updated:  02/13/20 1822     Color, UA Yellow     Clarity, UA Clear     pH, UA 6 0     Leukocytes, UA Small     Nitrite, UA Negative     Protein, UA Negative mg/dl      Glucose, UA Negative mg/dl      Ketones, UA 15 (1+) mg/dl      Urobilinogen, UA 0 2 E U /dl      Bilirubin, UA Negative     Blood, UA Trace     Specific [place], UA 1 010    Narrative:       [facility] RESULT                 No orders to display         Procedures  Procedures      ED Course                               MDM  Number of Diagnoses or Management Options  Dysuria:   Diagnosis management comments: I discussed the patient with the infectious disease physician on-call, Dr Prachi Gordon, who indicated that further antibiotics were not indicated at this time  In reviewing the urine cultures, current urine testing, and hearing about the patient's complaints he believes that they are more likely to be related to a cystitis then a ESBL infection  Advised that she follow-up with her primary care physician and discuss possible evaluation by Urology  I discussed this with the patient and provided her with a prescription for Pyridium as well    Advised her to follow up with her primary care physician to discuss her symptoms and to return to emergency department for any developed of nausea/vomiting, flank pain, myalgias  Disposition  Final diagnoses:   Dysuria     Time reflects when diagnosis was documented in both MDM as applicable and the Disposition within this note     Time User Action Codes Description Comment    2/13/2020  7:46 PM Sha Sinclair [R30 0] Dysuria       ED Disposition     ED Disposition Condition Date/Time Comment    Discharge Stable Thu Feb 13, 2020  7:46 PM Juancarlos Whipple discharge to home/self care              Follow-up Information     Follow up With Specialties Details Why Contact Info Additional Information    Venus Whiting MD Family Medicine In 3 days  [address]       [address] Emergency Department Emergency Medicine  If symptoms worsen [address]  [phone]  ED, [address] [place] [address], [place], [place], [address]   [phone]          Discharge Medication List as of 2/13/2020  7:47 PM      START taking these medications    Details   phenazopyridine (PYRIDIUM) 200 mg tablet Take 1 tablet (200 mg total) by mouth 3 (three) times a day, Starting Thu 2/13/2020, Print         CONTINUE these medications which have NOT CHANGED    Details   nitrofurantoin (MACROBID) 100 mg capsule Take 1 capsule (100 mg total) by mouth 2 (two) times a day for 7 days, Starting Tue 2/11/2020, Until Tue 2/18/2020, Normal      albuterol (PROAIR HFA) 90 mcg/act inhaler Inhale 2 puffs every 4 (four) hours as needed for wheezing or shortness of breath (cough), Starting Fri 4/12/2019, Print      cetirizine (ZyrTEC) 10 mg tablet Take 10 mg by mouth daily, Historical Med      ciprofloxacin-dexamethasone (CIPRODEX) otic suspension Administer 4 drops to the right ear 2 (two) times a day for 7 days, Starting Tue 8/13/2019, Until Tue 8/20/2019, Normal      fosfomycin (MONUROL) 3 g Take 3 g by mouth once for 1 dose, Starting Thu 2/13/2020, Phone In      mometasone (NASONEX) 50 mcg/act nasal spray 2 sprays into each nostril daily, Starting Wed 2/1/2017, Historical Med           No discharge procedures on file  ED Provider  Attending physically available and evaluated Mary Leung I managed the patient along with the ED Attending      Electronically Signed by         Wesly Santa MD  02/13/20 2014

## 2020-02-14 NOTE — PROGRESS NOTES
FAMILY PRACTICE OFFICE VISIT       NAME: Flo Brooke  AGE: 44 y o  SEX: female       : 1970        MRN: [id number]        Assessment and Plan     Problem List Items Addressed This Visit     None      Visit Diagnoses     Dysuria    -  Primary    Fever, unspecified fever cause        Relevant Orders    Influenza A/B and RSV PCR    Throat culture    Dermatitis        Relevant Orders    Throat culture    Urinary tract infection due to extended-spectrum beta lactamase (ESBL) producing Escherichia coli           Patient presents for follow-up  for evaluation of UTI and emergency room visit yesterday  She has been treated for recurrent symptoms of dysuria with urine culture positive for ESBL E coli  Patient was originally treated with Mary Flatter and her symptoms have improved  Subsequently she redeveloped symptoms of dysuria and urine culture was repeated  Most recent urine culture performed few days ago revealed 39,000 colony count of ESBL E coli  Patient was restarted  with regimen of Macrobid and was prescribed 1 dose of fosfomycin yesterday  Patient was referred to emergency room due to symptoms of fever  ED consulted infectious disease who recommended no further antibiotic therapy since patient did not exhibit signs and symptoms of pyelonephritis  Emergency room prescribed Pyridium p r n  Patient has developed symptoms of fever within past 48 hours and has been experiencing generalized malaise and muscle aches along with mild nasal congestion, postnasal drip, patient denies symptoms of nausea, vomiting, sore throat, flank pain or cough  She is actually feeling better today and is currently afebrile and denies symptoms of urinary burning, suprapubic or flank pain, frequency or urgency  Patient has been using ibuprofen and Tylenol for fever, last dose early this morning  T-max today 100  4  Patient has developed mildly pruritic papular rash on her hands, feet, arms, ankles and thighs    She denies difficulty swallowing or breathing, no edema, rash is not too bothersome  I am concerned about possibility of allergic reaction to Macrobid or Pyridium versus viral syndrome, versus occult strep? I advised patient to discontinue Macrobid  Patient will stop Pyridium  Continue Tylenol and ibuprofen as needed for fever  Will proceed with testing for flu and strep  Patient will start Zyrtec 10 mg once a day, she may add Benadryl 25 mg cues 4-6 hours p r n  If needed for pruritus  Will be in touch with patient regarding repeat urine culture results that was ordered by ED colleagues  I strongly advised patient to contact on-call physician with any questions, concerns, persistence of fever or rash as well as recurrence of dysuria  There are no Patient Instructions on file for this visit  Discussed with the patient and all questioned fully answered  She will call me if any problems arise  M*Modal software was used to dictate this note  It may contain errors with dictating incorrect words/spelling  Please contact provider directly with any questions  Chief Complaint     Chief Complaint   Patient presents with    ER follow up UTI     Developed rash on bilateral arms and legs, Red, itchy and raised       History of Present Illness     Patient presents for evaluation of recurrent dysuria and new onset of rash  She was seen in the office on February 3rd by Dr Mariposa Abdi and was diagnosed with urinary tract infection  Patient presented with symptoms of dysuria but denies symptoms of fever, flank pain, urgency, frequency or hematuria  Patient was started on Macrobid and reported improvement of symptoms  Her urine culture revealed ESBL E coli  Patient was subsequently switched to fosfomycin which was not covered by her insurance  Patient restarted Macrobid few days ago and reports no symptoms of dysuria today  She denies abdominal discomfort, pelvic pressure flank pain    Patient started with symptoms of fever and generalized fatigue and achiness yesterday  She has been alternated ibuprofen and Tylenol  She complains of mild nasal congestion and postnasal drip but denies symptoms of cough, rhinorrhea etc   Patient is a , multiple sick contacts, she is up-to-date with flu vaccine this fall  Due to persistent fever and concerned about pyelonephritis, patient was referred to emergency room last night  She was evaluated [facility]  Infectious disease on call was consulted and recommended against further antibacterial therapy since patient's most recent urine culture revealed ESBL E coli of 39,000 colonies, clinically, patient experience symptoms of cystitis  Patient was discharged from emergency room with prescription for Pyridium  She took medication and woke up with pruritic rash on her hands, forearms, feet, ankles and thighs  No rash on face, torso, back, chest or abdomen  Patient denies difficulty breathing or swallowing  Rash is minimally bothersome  Patient has not been using any antihistamines lately  Patient denies recent travel  No history of UTI  Periods have been irregular within past few cycles  Review of Systems   Review of Systems   Constitutional: Positive for fatigue and fever  Patient is afebrile now, she has been using ibuprofen, last dose few hours ago   HENT: Positive for congestion and postnasal drip  Eyes: Negative  Respiratory: Negative  Cardiovascular: Negative  Gastrointestinal: Negative  Endocrine: Negative  Genitourinary: Positive for dysuria ( resolved)  Negative for flank pain, frequency, hematuria, pelvic pain and urgency  Musculoskeletal: Positive for myalgias  Allergic/Immunologic: Negative  Neurological: Negative  Hematological: Negative  Psychiatric/Behavioral: Negative          Active Problem List     Patient Active Problem List   Diagnosis    Vitamin D deficiency    Vitamin B12 deficiency    Allergic rhinitis    UTI symptoms       Past Medical History:  Past Medical History:   Diagnosis Date    Bronchospasm     Chest pain     Conjunctivitis     Dizziness     Middle ear effusion, right     last assessed-2017    Onychomycosis     Vertigo        Past Surgical History:  Past Surgical History:   Procedure Laterality Date     SECTION, LOW TRANSVERSE      OTHER SURGICAL HISTORY      Angiography pulmonary-2007- no evidence of pulmonary embolus, no evidence of occult pulmonary disease, 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 just lateral to the costovertebral junction      OTHER SURGICAL HISTORY      ECG normal variant-2007       Family History:  Family History   Problem Relation Age of Onset    Skin cancer Mother         melanoma    Thyroid disease Mother     Coronary artery disease Father     Polymyalgia rheumatica Paternal Uncle        Social History:  Social History     Socioeconomic History    Marital status: /Civil Union     Spouse name: Not on file    Number of children: Not on file    Years of education: Not on file    Highest education level: Not on file   Occupational History     Comment: working full time   Social Needs    Financial resource strain: Not on file    Food insecurity:     Worry: Not on file     Inability: Not on file   Hydra Dx needs:     Medical: Not on file     Non-medical: Not on file   Tobacco Use    Smoking status: Never Smoker    Smokeless tobacco: Never Used   Substance and Sexual Activity    Alcohol use: Not Currently     Comment: social    Drug use: No    Sexual activity: Yes     Partners: Male     Birth control/protection: Condom   Lifestyle    Physical activity:     Days per week: Not on file     Minutes per session: Not on file    Stress: Not on file   Relationships    Social connections:     Talks on phone: Not on file     Gets together: Not on file Attends [employer] service: Not on file     Active member of club or organization: Not on file     Attends meetings of clubs or organizations: Not on file     Relationship status: Not on file    Intimate partner violence:     Fear of current or ex partner: Not on file     Emotionally abused: Not on file     Physically abused: Not on file     Forced sexual activity: Not on file   Other Topics Concern    Not on file   Social History Narrative    Not on file           Objective     Vitals:    02/14/20 1152   Pulse: 64   Resp: 18   Temp: (!) 96 9 °F (36 1 °C)   TempSrc: Tympanic   Weight: 64 4 kg (142 lb)   Height: 5' 6" (1 676 m)     Wt Readings from Last 3 Encounters:   02/14/20 64 4 kg (142 lb)   02/13/20 63 5 kg (140 lb)   02/03/20 64 1 kg (141 lb 6 oz)       Physical Exam   Constitutional: She is oriented to person, place, and time  She appears well-developed and well-nourished  HENT:   Head: Normocephalic and atraumatic  Right Ear: Tympanic membrane normal    Left Ear: Tympanic membrane normal    Oropharynx:  Mild erythema, postnasal drip, no exudates   Eyes: Conjunctivae are normal    Neck: Neck supple  Carotid bruit is not present  No thyromegaly present  Cardiovascular: Normal rate, regular rhythm and normal heart sounds  No murmur heard  Pulmonary/Chest: Effort normal and breath sounds normal  No respiratory distress  She has no wheezes  Abdominal: Soft  Normal appearance and bowel sounds are normal  She exhibits no distension and no abdominal bruit  There is no tenderness  There is no guarding and no CVA tenderness  Musculoskeletal: Normal range of motion  She exhibits no edema  Neurological: She is alert and oriented to person, place, and time  No cranial nerve deficit  Coordination normal    Skin: Rash noted     Papular small erythematous rash well circumcised, approximately 3-5 mm mildly pruritic papules dorsal surface of feet and hands, forearms, ankles, thighs, no rash on torso, abdomen, chest, face and neck   Psychiatric: She has a normal mood and affect  Her behavior is normal    Nursing note and vitals reviewed        Pertinent Laboratory/Diagnostic Studies:  Lab Results   Component Value Date    BUN 16 05/04/2019    CREATININE 0 92 05/04/2019    CALCIUM 8 7 05/04/2019    K 4 2 05/04/2019    CO2 28 05/04/2019     05/04/2019     Lab Results   Component Value Date    ALT 19 05/04/2019    AST 18 05/04/2019    ALKPHOS 24 (L) 05/04/2019       Lab Results   Component Value Date    WBC 4 86 05/04/2019    HGB 12 8 05/04/2019    HCT 39 3 05/04/2019    MCV 99 (H) 05/04/2019     05/04/2019       No results found for: TSH    No results found for: CHOL  Lab Results   Component Value Date    TRIG 45 05/04/2019     Lab Results   Component Value Date    HDL 76 (H) 05/04/2019     Lab Results   Component Value Date    LDLCALC 53 05/04/2019     No results found for: HGBA1C    Results for orders placed or performed during the hospital encounter of 02/13/20   Urine Microscopic   Result Value Ref Range    RBC, UA 2-4 (A) None Seen, 0-5 /hpf    WBC, UA 10-20 (A) None Seen, 0-5, 5-55, 5-65 /hpf    Epithelial Cells Moderate (A) None Seen, Occasional /hpf    Bacteria, UA Occasional None Seen, Occasional /hpf   Urine Macroscopic, POC   Result Value Ref Range    Color, UA Yellow     Clarity, UA Clear     pH, UA 6 0 4 5 - 8 0    Leukocytes, UA Small (A) Negative    Nitrite, UA Negative Negative    Protein, UA Negative Negative mg/dl    Glucose, UA Negative Negative mg/dl    Ketones, UA 15 (1+) (A) Negative mg/dl    Urobilinogen, UA 0 2 0 2, 1 0 E U /dl E U /dl    Bilirubin, UA Negative Negative    Blood, UA Trace (A) Negative    Specific Gravity, UA 1 010 1 003 - 1 030       Orders Placed This Encounter   Procedures    Influenza A/B and RSV PCR    Throat culture       ALLERGIES:  Allergies   Allergen Reactions    Sulfa Antibiotics Hives       Current Medications     Current Outpatient Medications Medication Sig Dispense Refill    albuterol (PROAIR HFA) 90 mcg/act inhaler Inhale 2 puffs every 4 (four) hours as needed for wheezing or shortness of breath (cough) 1 Inhaler 1    cetirizine (ZyrTEC) 10 mg tablet Take 10 mg by mouth daily      mometasone (NASONEX) 50 mcg/act nasal spray 2 sprays into each nostril daily       Current Facility-Administered Medications   Medication Dose Route Frequency Provider Last Rate Last Dose    cyanocobalamin injection 1,000 mcg  1,000 mcg Intramuscular Q30 Days Ana Garner MD   1,000 mcg at 08/29/18 1702    cyanocobalamin injection 1,000 mcg  1,000 mcg Intramuscular Q30 Days Ana Garner MD   1,000 mcg at 09/12/18 1644    cyanocobalamin injection 1,000 mcg  1,000 mcg Intramuscular Q30 Days GONZALO Leyva   1,000 mcg at 10/17/18 1717    cyanocobalamin injection 1,000 mcg  1,000 mcg Intramuscular Q30 Days Ana Garner MD   1,000 mcg at 12/19/18 1632    cyanocobalamin injection 1,000 mcg  1,000 mcg Intramuscular Q30 Days GONZALO Leyva   1,000 mcg at 06/05/19 1613    cyanocobalamin injection 1,000 mcg  1,000 mcg Intramuscular Q30 Days Ana Garner MD   1,000 mcg at 07/05/19 1404    cyanocobalamin injection 1,000 mcg  1,000 mcg Intramuscular Q30 Days Ana Garner MD   1,000 mcg at 09/12/19 1625    cyanocobalamin injection 1,000 mcg  1,000 mcg Intramuscular Q30 Days Ana Garner MD   1,000 mcg at 10/09/19 1704    cyanocobalamin injection 1,000 mcg  1,000 mcg Intramuscular Q30 Days GONZALO Leyva   1,000 mcg at 11/13/19 1617       Medications Discontinued During This Encounter   Medication Reason    ciprofloxacin-dexamethasone (CIPRODEX) otic suspension Therapy completed    fosfomycin (MONUROL) 3 g Duplicate order    phenazopyridine (PYRIDIUM) 200 mg tablet     nitrofurantoin (MACROBID) 100 mg capsule     MONUROL 3 g        Health Maintenance     Health Maintenance   Topic Date Due    CRC Screening: Colonoscopy 1970    HIV Screening  07/11/1983    Annual Physical  07/11/1986    Depression Screening PHQ  07/31/2020    BMI: Adult  02/14/2021    MAMMOGRAM  08/30/2021    Cervical Cancer Screening  04/30/2023    DTaP,Tdap,and Td Vaccines (2 - Td) 06/20/2023    Pneumococcal Vaccine: 65+ Years (1 of 2 - PCV13) 07/11/2033    Influenza Vaccine  Completed    Pneumococcal Vaccine: Pediatrics (0 to 5 Years) and At-Risk Patients (6 to 59 Years)  Aged Out    HIB Vaccine  Aged Out    Hepatitis B Vaccine  Aged Out    IPV Vaccine  Aged Out    Hepatitis A Vaccine  Aged Out    Meningococcal ACWY Vaccine  Aged Out    HPV Vaccine  Aged [employer] History   Administered Date(s) Administered     Influenza (IM) Preservative Free 11/01/2018    INFLUENZA 10/28/2019    Influenza TIV (IM) 12/05/2009    Tdap 06/20/2013       Anika Donahue MD

## 2020-02-15 LAB — BACTERIA UR CULT: ABNORMAL

## 2020-02-16 LAB — BACTERIA THROAT CULT: NORMAL

## 2020-02-18 ENCOUNTER — TELEPHONE (OUTPATIENT)
Dept: FAMILY MEDICINE CLINIC | Facility: CLINIC | Age: 50
End: 2020-02-18

## 2020-02-18 DIAGNOSIS — N39.0 UTI DUE TO EXTENDED-SPECTRUM BETA LACTAMASE (ESBL) PRODUCING ESCHERICHIA COLI: Primary | ICD-10-CM

## 2020-02-18 DIAGNOSIS — B96.29 UTI DUE TO EXTENDED-SPECTRUM BETA LACTAMASE (ESBL) PRODUCING ESCHERICHIA COLI: Primary | ICD-10-CM

## 2020-02-18 DIAGNOSIS — Z16.12 UTI DUE TO EXTENDED-SPECTRUM BETA LACTAMASE (ESBL) PRODUCING ESCHERICHIA COLI: Primary | ICD-10-CM

## 2020-02-18 NOTE — TELEPHONE ENCOUNTER
Please contact patient  I received results of throat culture, flu testing and repeat urine culture    · No evidence of ESBL E coli on recent urine culture  · Flu test was negative, throat culture was negative  · Current urine culture indicate gross of lactobacillus which is normal vaginal bacteria and would not warrant treatment unless patient has symptoms    Please find out if she is experiencing any symptoms of dysuria/burning or any other concerns    Please let me know if she is still experiencing symptoms of rash    Patient should schedule consultation with [facility] due to previous finding of abnormal E coli in her urine  I will place orders    Thank you

## 2020-02-18 NOTE — TELEPHONE ENCOUNTER
Pt aware but asked if eating  wheat germ and exposure to cat liter cause her issues  She does not have the rash anymore and not symptomatic of dysuria/burning  Pt scheduled for urology   Please advise

## 2020-02-27 NOTE — ED ATTENDING ATTESTATION
2/13/2020  Mary DIAZ MD, saw and evaluated the patient  I have discussed the patient with the resident/non-physician practitioner and agree with the resident's/non-physician practitioner's findings, Plan of Care, and MDM as documented in the resident's/non-physician practitioner's note, except where noted  All available labs and Radiology studies were reviewed  I was present for key portions of any procedure(s) performed by the resident/non-physician practitioner and I was immediately available to provide assistance  At this point I agree with the current assessment done in the Emergency Department  I have conducted an independent evaluation of this patient a history and physical is as follows:    ED Course     Patient presents for evaluation due to dysuria  Patient states that she was diagnosed with the urinary tract infection last week and took a 5 day course of nitrofurantoin with improvement  Patient had recurrence of symptoms and then was started on a no other course of antibiotics  Patient states that over the last 1 day she had recurrence of symptoms with a fever to 101  Patient states that her primary physician told her to come to the emergency department because her urine had a bacteria that would be resistant to her antibiotics  Exam: AAOx3, NAD, RRR, CTA, S/NT/ND, no CVA tenderness  A/P:  UTI, ESBL bacteria  Will recheck urine and discussed case with ID to determine antibiotics       Critical Care Time  Procedures

## 2020-02-28 ENCOUNTER — CONSULT (OUTPATIENT)
Dept: UROLOGY | Facility: MEDICAL CENTER | Age: 50
End: 2020-02-28
Payer: COMMERCIAL

## 2020-02-28 VITALS
DIASTOLIC BLOOD PRESSURE: 82 MMHG | BODY MASS INDEX: 23.14 KG/M2 | HEART RATE: 65 BPM | WEIGHT: 144 LBS | SYSTOLIC BLOOD PRESSURE: 126 MMHG | HEIGHT: 66 IN

## 2020-02-28 DIAGNOSIS — Z16.12 UTI DUE TO EXTENDED-SPECTRUM BETA LACTAMASE (ESBL) PRODUCING ESCHERICHIA COLI: ICD-10-CM

## 2020-02-28 DIAGNOSIS — N39.0 UTI DUE TO EXTENDED-SPECTRUM BETA LACTAMASE (ESBL) PRODUCING ESCHERICHIA COLI: ICD-10-CM

## 2020-02-28 DIAGNOSIS — K59.00 CONSTIPATION, UNSPECIFIED CONSTIPATION TYPE: Primary | ICD-10-CM

## 2020-02-28 DIAGNOSIS — B96.29 UTI DUE TO EXTENDED-SPECTRUM BETA LACTAMASE (ESBL) PRODUCING ESCHERICHIA COLI: ICD-10-CM

## 2020-02-28 PROBLEM — R39.9 UTI SYMPTOMS: Status: RESOLVED | Noted: 2020-02-05 | Resolved: 2020-02-28

## 2020-02-28 LAB
POST-VOID RESIDUAL VOLUME, ML POC: 37 ML
SL AMB  POCT GLUCOSE, UA: ABNORMAL
SL AMB LEUKOCYTE ESTERASE,UA: ABNORMAL
SL AMB POCT BILIRUBIN,UA: ABNORMAL
SL AMB POCT BLOOD,UA: ABNORMAL
SL AMB POCT CLARITY,UA: CLEAR
SL AMB POCT COLOR,UA: YELLOW
SL AMB POCT KETONES,UA: ABNORMAL
SL AMB POCT NITRITE,UA: ABNORMAL
SL AMB POCT PH,UA: 6
SL AMB POCT SPECIFIC GRAVITY,UA: 1.01
SL AMB POCT URINE PROTEIN: ABNORMAL
SL AMB POCT UROBILINOGEN: 0.2

## 2020-02-28 PROCEDURE — 99244 OFF/OP CNSLTJ NEW/EST MOD 40: CPT | Performed by: UROLOGY

## 2020-02-28 PROCEDURE — 81003 URINALYSIS AUTO W/O SCOPE: CPT | Performed by: UROLOGY

## 2020-02-28 PROCEDURE — 51798 US URINE CAPACITY MEASURE: CPT | Performed by: UROLOGY

## 2020-02-28 NOTE — PROGRESS NOTES
Assessment/Plan:    UTI due to extended-spectrum beta lactamase (ESBL) producing Escherichia coli  Urinalysis is now negative and the patient is asymptomatic  She is voiding well  Her postvoid residual is normal   I recommended we screen her upper tracts with a retroperitoneal ultrasound  At this point I do not feel she need cystoscopy  We did discuss infection prevention methods  She will keep her fluid intake high  She can use cranberry supplements or cranberry juice  She will return should she develop recurrent infections or if the ultrasound is abnormal     Constipation  Patient has a long history of chronic constipation  Did recommend this be treated as it will help improve her urinary tract health  She has not yet had colonoscopy and I did place a referral to Gastroenterology for this  Diagnoses and all orders for this visit:    Constipation, unspecified constipation type  -     Ambulatory referral to Gastroenterology; Future    UTI due to extended-spectrum beta lactamase (ESBL) producing Escherichia coli  -     Ambulatory referral to Urology  -     POCT Measure PVR  -     POCT urine dip auto non-scope  -     US retroperitoneal complete; Future          Subjective:      Patient ID: Jessica Milligan is a 44 y o  female  Chief complaint:  Urinary tract infection    HPI:  33-year-old female who notes her 1st urinary tract infection recently  This did occur after sexual activity  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  She does feels she empties her bladder  Symptoms of the infection included dysuria and frequency  She did have some blood in the urine  Initially she was treated with a 5 day course of Macrobid but her symptoms did return and she developed a febrile infection  She was seen in the emergency room and treated with fosfomycin as well as another course Macrobid  She soon developed a rash and stop the Macrobid  She had no  flank pain    At the present time she feels well and is voiding well  Her dysuria has resolved and she is back to normal   There is no back or flank pain  She has no incontinence or irritative voiding symptoms  The following portions of the patient's history were reviewed and updated as appropriate: allergies, current medications, past family history, past medical history, past social history, past surgical history and problem list     Review of Systems   Constitutional: Negative for activity change, appetite change, fatigue and fever  HENT: Negative  Eyes: Negative  Respiratory: Negative  Cardiovascular: Negative  Gastrointestinal: Positive for constipation  Negative for blood in stool, diarrhea and vomiting  Endocrine: Negative  Genitourinary:        See HPI   Musculoskeletal: Negative  Skin: Negative  Allergic/Immunologic: Negative  Neurological: Negative  Hematological: Negative  Psychiatric/Behavioral: Negative  Objective:      /82 (BP Location: Right arm, Patient Position: Sitting, Cuff Size: Adult)   Pulse 65   Ht 5' 6" (1 676 m)   Wt 65 3 kg (144 lb)   BMI 23 24 kg/m²          Physical Exam   Constitutional: She is oriented to person, place, and time  She appears well-developed and well-nourished  No distress  HENT:   Head: Normocephalic and atraumatic  Eyes: Conjunctivae are normal    Neck: Neck supple  Cardiovascular: Normal rate  Pulmonary/Chest: Effort normal    Abdominal: Soft  She exhibits no distension and no mass  There is no tenderness  There is no rebound, no guarding and no CVA tenderness  No hernia  No CVAT   Musculoskeletal: Normal range of motion  She exhibits no edema  Neurological: She is alert and oriented to person, place, and time  Skin: Skin is warm and dry  She is not diaphoretic  Psychiatric: She has a normal mood and affect  Her behavior is normal  Judgment and thought content normal    Vitals reviewed

## 2020-02-28 NOTE — PATIENT INSTRUCTIONS
Urinary Traction Infection in Older Adults   WHAT YOU NEED TO KNOW:   A urinary tract infection (UTI) is caused by bacteria that get inside your urinary tract  Your urinary tract includes your kidneys, ureters, bladder, and urethra  Urine is made in your kidneys, and it flows from the ureters to the bladder  Urine leaves the bladder through the urethra  A UTI is more common in your lower urinary tract, which includes your bladder and urethra  DISCHARGE INSTRUCTIONS:   Return to the emergency department if:   · You are urinating very little or not at all  · You are vomiting  · You have a high fever with shaking chills  · You have side or back pain that gets worse  Contact your healthcare provider if:   · You have a fever  · You are a woman and you have increased white or yellow discharge from your vagina  · You do not feel better after 2 days of taking antibiotics  · You have questions or concerns about your condition or care  Medicines:   · Medicines  help treat the bacterial infection or decrease pain and burning when you urinate  You may also need medicines to decrease the urge to urinate often  Your healthcare provider may recommend cranberry juice or cranberry supplements to help decrease your symptoms  · Take your medicine as directed  Contact your healthcare provider if you think your medicine is not helping or if you have side effects  Tell him or her if you are allergic to any medicine  Keep a list of the medicines, vitamins, and herbs you take  Include the amounts, and when and why you take them  Bring the list or the pill bottles to follow-up visits  Carry your medicine list with you in case of an emergency  Self-care:   · Urinate when you feel the urge  Do not hold your urine because bacteria can grow in the bladder if urine stays in the bladder too long  It may be helpful to urinate at least every 3 to 4 hours  · Drink liquids as directed    Liquids can help flush bacteria from your urinary tract  Ask how much liquid to drink each day and which liquids are best for you  You may need to drink more liquids than usual to help flush out the bacteria  Do not drink alcohol, caffeine, and citrus juices  These can irritate your bladder and increase your symptoms  · Apply heat  on your abdomen for 20 to 30 minutes every 2 hours for as many days as directed  Heat helps decrease discomfort and pressure in your bladder  Prevent a UTI:   · Women should wipe front to back  after urinating or having a bowel movement  This may prevent germs from getting into the urinary tract  · Urinate after you have sex  to flush away bacteria that can enter your urinary tract during sex  · Wear cotton underwear and clothes that fit loose  Tight pants and nylon underwear can trap moisture and cause bacteria to grow  Follow up with your healthcare provider as directed:  Write down your questions so you remember to ask them during your visits  © 2017 [employer] Information is for End User's use only and may not be sold, redistributed or otherwise used for commercial purposes  All illustrations and images included in CareNotes® are the copyrighted property of [employer]  or [employer]  The above information is an  only  It is not intended as medical advice for individual conditions or treatments  Talk to your doctor, nurse or pharmacist before following any medical regimen to see if it is safe and effective for you

## 2020-02-28 NOTE — LETTER
February 28, 2020     Jia Knight MD  [address] [facility] [address]    Patient: Raquel Capone   YOB: 1970   Date of Visit: 2/28/2020       Dear Dr Bessie Betancourt: Thank you for referring Heaven Mcdowell to me for evaluation  Below are my notes for this consultation  If you have questions, please do not hesitate to call me  I look forward to following your patient along with you  Sincerely,        Isaiah Salmeron MD        CC: No Recipients  Isaiah Salmeron MD  2/28/2020  3:56 PM  Sign at close encounter  Assessment/Plan:    UTI due to extended-spectrum beta lactamase (ESBL) producing Escherichia coli  Urinalysis is now negative and the patient is asymptomatic  She is voiding well  Her postvoid residual is normal   I recommended we screen her upper tracts with a retroperitoneal ultrasound  At this point I do not feel she need cystoscopy  We did discuss infection prevention methods  She will keep her fluid intake high  She can use cranberry supplements or cranberry juice  She will return should she develop recurrent infections or if the ultrasound is abnormal     Constipation  Patient has a long history of chronic constipation  Did recommend this be treated as it will help improve her urinary tract health  She has not yet had colonoscopy and I did place a referral to Gastroenterology for this  Diagnoses and all orders for this visit:    Constipation, unspecified constipation type  -     Ambulatory referral to Gastroenterology; Future    UTI due to extended-spectrum beta lactamase (ESBL) producing Escherichia coli  -     Ambulatory referral to Urology  -     POCT Measure PVR  -     POCT urine dip auto non-scope  -     US retroperitoneal complete; Future          Subjective:      Patient ID: Raquel Capone is a 44 y o  female  Chief complaint:  Urinary tract infection    HPI:  69-year-old female who notes her 1st urinary tract infection recently  This did occur after sexual activity  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  She does feels she empties her bladder  Symptoms of the infection included dysuria and frequency  She did have some blood in the urine  Initially she was treated with a 5 day course of Macrobid but her symptoms did return and she developed a febrile infection  She was seen in the emergency room and treated with fosfomycin as well as another course Macrobid  She soon developed a rash and stop the Macrobid  She had no  flank pain  At the present time she feels well and is voiding well  Her dysuria has resolved and she is back to normal   There is no back or flank pain  She has no incontinence or irritative voiding symptoms  The following portions of the patient's history were reviewed and updated as appropriate: allergies, current medications, past family history, past medical history, past social history, past surgical history and problem list     Review of Systems   Constitutional: Negative for activity change, appetite change, fatigue and fever  HENT: Negative  Eyes: Negative  Respiratory: Negative  Cardiovascular: Negative  Gastrointestinal: Positive for constipation  Negative for blood in stool, diarrhea and vomiting  Endocrine: Negative  Genitourinary:        See HPI   Musculoskeletal: Negative  Skin: Negative  Allergic/Immunologic: Negative  Neurological: Negative  Hematological: Negative  Psychiatric/Behavioral: Negative  Objective:      /82 (BP Location: Right arm, Patient Position: Sitting, Cuff Size: Adult)   Pulse 65   Ht 5' 6" (1 676 m)   Wt 65 3 kg (144 lb)   BMI 23 24 kg/m²           Physical Exam   Constitutional: She is oriented to person, place, and time  She appears well-developed and well-nourished  No distress  HENT:   Head: Normocephalic and atraumatic  Eyes: Conjunctivae are normal    Neck: Neck supple  Cardiovascular: Normal rate  Pulmonary/Chest: Effort normal    Abdominal: Soft  She exhibits no distension and no mass  There is no tenderness  There is no rebound, no guarding and no CVA tenderness  No hernia  No CVAT   Musculoskeletal: Normal range of motion  She exhibits no edema  Neurological: She is alert and oriented to person, place, and time  Skin: Skin is warm and dry  She is not diaphoretic  Psychiatric: She has a normal mood and affect  Her behavior is normal  Judgment and thought content normal    Vitals reviewed

## 2020-02-28 NOTE — ASSESSMENT & PLAN NOTE
Urinalysis is now negative and the patient is asymptomatic  She is voiding well  Her postvoid residual is normal   I recommended we screen her upper tracts with a retroperitoneal ultrasound  At this point I do not feel she need cystoscopy  We did discuss infection prevention methods  She will keep her fluid intake high  She can use cranberry supplements or cranberry juice    She will return should she develop recurrent infections or if the ultrasound is abnormal

## 2020-02-28 NOTE — ASSESSMENT & PLAN NOTE
Patient has a long history of chronic constipation  Did recommend this be treated as it will help improve her urinary tract health  She has not yet had colonoscopy and I did place a referral to Gastroenterology for this

## 2020-03-06 ENCOUNTER — HOSPITAL ENCOUNTER (OUTPATIENT)
Dept: RADIOLOGY | Facility: HOSPITAL | Age: 50
Discharge: HOME | End: 2020-03-06
Attending: UROLOGY
Payer: COMMERCIAL

## 2020-03-06 DIAGNOSIS — B96.29 UTI DUE TO EXTENDED-SPECTRUM BETA LACTAMASE (ESBL) PRODUCING ESCHERICHIA COLI: ICD-10-CM

## 2020-03-06 DIAGNOSIS — N39.0 UTI DUE TO EXTENDED-SPECTRUM BETA LACTAMASE (ESBL) PRODUCING ESCHERICHIA COLI: ICD-10-CM

## 2020-03-06 DIAGNOSIS — Z16.12 UTI DUE TO EXTENDED-SPECTRUM BETA LACTAMASE (ESBL) PRODUCING ESCHERICHIA COLI: ICD-10-CM

## 2020-03-06 PROCEDURE — 76770 US EXAM ABDO BACK WALL COMP: CPT

## 2020-03-13 ENCOUNTER — TELEPHONE (OUTPATIENT)
Dept: UROLOGY | Facility: MEDICAL CENTER | Age: 50
End: 2020-03-13

## 2020-03-13 NOTE — TELEPHONE ENCOUNTER
Left message; Her U/S results were normal and she is to keep her appointment as scheduled for 04/08/2020

## 2020-04-17 ENCOUNTER — OFFICE VISIT (OUTPATIENT)
Dept: GASTROENTEROLOGY | Facility: CLINIC | Age: 50
End: 2020-04-17
Payer: COMMERCIAL

## 2020-04-17 VITALS
HEIGHT: 66 IN | SYSTOLIC BLOOD PRESSURE: 146 MMHG | BODY MASS INDEX: 23.3 KG/M2 | HEART RATE: 86 BPM | DIASTOLIC BLOOD PRESSURE: 86 MMHG | WEIGHT: 145 LBS | TEMPERATURE: 98.7 F

## 2020-04-17 DIAGNOSIS — K59.00 CONSTIPATION, UNSPECIFIED CONSTIPATION TYPE: Primary | ICD-10-CM

## 2020-04-17 PROCEDURE — 99244 OFF/OP CNSLTJ NEW/EST MOD 40: CPT | Performed by: INTERNAL MEDICINE

## 2020-04-17 RX ORDER — POLYETHYLENE GLYCOL 3350 17 G/17G
17 POWDER, FOR SOLUTION ORAL DAILY
Qty: 510 G | Refills: 2 | Status: SHIPPED | OUTPATIENT
Start: 2020-04-17 | End: 2020-10-01 | Stop reason: ALTCHOICE

## 2020-04-17 RX ORDER — SODIUM, POTASSIUM,MAG SULFATES 17.5-3.13G
180 SOLUTION, RECONSTITUTED, ORAL ORAL ONCE
Qty: 180 ML | Refills: 0 | Status: SHIPPED | OUTPATIENT
Start: 2020-04-17 | End: 2020-10-01 | Stop reason: ALTCHOICE

## 2020-07-14 ENCOUNTER — ANNUAL EXAM (OUTPATIENT)
Dept: OBGYN CLINIC | Facility: CLINIC | Age: 50
End: 2020-07-14
Payer: COMMERCIAL

## 2020-07-14 VITALS
DIASTOLIC BLOOD PRESSURE: 76 MMHG | SYSTOLIC BLOOD PRESSURE: 110 MMHG | HEIGHT: 66 IN | TEMPERATURE: 99.2 F | BODY MASS INDEX: 23.11 KG/M2 | WEIGHT: 143.8 LBS

## 2020-07-14 DIAGNOSIS — Z12.39 BREAST CANCER SCREENING: ICD-10-CM

## 2020-07-14 DIAGNOSIS — Z01.419 WOMEN'S ANNUAL ROUTINE GYNECOLOGICAL EXAMINATION: Primary | ICD-10-CM

## 2020-07-14 PROCEDURE — S0612 ANNUAL GYNECOLOGICAL EXAMINA: HCPCS | Performed by: OBSTETRICS & GYNECOLOGY

## 2020-07-14 PROCEDURE — 3008F BODY MASS INDEX DOCD: CPT | Performed by: INTERNAL MEDICINE

## 2020-07-14 NOTE — PATIENT INSTRUCTIONS
The patient was informed of a stable suma menopausal gyn examination  She will watch for signs of menopause  She will make arrangements for colonoscopy and mammogram   Return my office in 1 year    A Pap smear was not performed

## 2020-07-14 NOTE — PROGRESS NOTES
Assessment/Plan:    The patient was informed of a stable suma menopausal gyn examination  A Pap smear was not performed  She had a normal HPV in 2018  She denies any major gynecological  GI complaint  She will make arrangements for colonoscopy  She needs to follow her mammograms  She return my office in 1 year  She is happy with her weight  No problem depression anxiety  She has a dentist on a regular basis  There are no new major family illnesses report at this time  Subjective:      Patient ID: Patricia Lay is a 44 y o  female  HPI    This is a 61-year-old white female, she is a  2 para 2 with 2 prior  sections  Her current method of contraception includes condoms  Her menstrual cycles are regular predictable  They are not as heavy as before  She denies any major  complaint  She does have a problem with constipation she saw a GI specialist any put her on MiraLax  She is happy with her weight  Denies any problem depression or anxiety  She has a dentist on a regular basis  There are no new major family illnesses report this time  She is doing well with the COVID situation  Her last Pap smear was HPV negative in 2018  The following portions of the patient's history were reviewed and updated as appropriate: allergies, current medications, past family history, past medical history, past social history, past surgical history and problem list     Review of Systems      Objective:      /76   Temp 99 2 °F (37 3 °C)   Ht 5' 6" (1 676 m)   Wt 65 2 kg (143 lb 12 8 oz)   LMP 2020 (Exact Date)   BMI 23 21 kg/m²          Physical Exam   Constitutional: She is oriented to person, place, and time  She appears well-developed and well-nourished  HENT:   Head: Normocephalic and atraumatic  Eyes: Pupils are equal, round, and reactive to light  EOM are normal    Neck: Normal range of motion  Neck supple  No JVD present  No tracheal deviation present   No thyromegaly present  Cardiovascular: Normal rate, regular rhythm and normal heart sounds  Exam reveals no gallop and no friction rub  No murmur heard  Pulmonary/Chest: Effort normal and breath sounds normal  No stridor  No respiratory distress  She has no wheezes  She has no rales  She exhibits no tenderness  Right breast exhibits no inverted nipple, no mass, no nipple discharge, no skin change and no tenderness  Left breast exhibits no inverted nipple, no mass, no nipple discharge, no skin change and no tenderness  No breast swelling, tenderness, discharge or bleeding  Breasts are symmetrical    Abdominal: Soft  Bowel sounds are normal  She exhibits no distension and no mass  There is no hepatosplenomegaly  There is no tenderness  There is no rebound and no guarding  No hernia  Hernia confirmed negative in the right inguinal area and confirmed negative in the left inguinal area  Genitourinary: Rectum normal, vagina normal and uterus normal  No labial fusion  There is no rash, tenderness, lesion or injury on the right labia  There is no rash, tenderness, lesion or injury on the left labia  Uterus is not deviated, not enlarged, not fixed and not tender  Cervix exhibits no motion tenderness, no discharge and no friability  Right adnexum displays no mass, no tenderness and no fullness  Left adnexum displays no mass, no tenderness and no fullness  No erythema, tenderness or bleeding in the vagina  No foreign body in the vagina  No signs of injury around the vagina  No vaginal discharge found  Genitourinary Comments: The external genitalia normal limits the vagina is clean the cervix is closed uterus is anterior normal size is no cervical motion tenderness  The adnexa clear bilaterally  A Pap smear was not performed  There is no prolapse  Musculoskeletal: Normal range of motion  Lymphadenopathy:     She has no cervical adenopathy  No inguinal adenopathy noted on the right or left side     Neurological: She is alert and oriented to person, place, and time  Skin: Skin is warm and dry  Psychiatric: She has a normal mood and affect   Her behavior is normal

## 2020-07-23 ENCOUNTER — PREP FOR PROCEDURE (OUTPATIENT)
Dept: GASTROENTEROLOGY | Facility: CLINIC | Age: 50
End: 2020-07-23

## 2020-07-23 ENCOUNTER — TELEPHONE (OUTPATIENT)
Dept: GASTROENTEROLOGY | Facility: CLINIC | Age: 50
End: 2020-07-23

## 2020-07-23 ENCOUNTER — ANESTHESIA EVENT (OUTPATIENT)
Dept: GASTROENTEROLOGY | Facility: AMBULARY SURGERY CENTER | Age: 50
End: 2020-07-23

## 2020-07-23 DIAGNOSIS — Z20.822 ENCOUNTER FOR LABORATORY TESTING FOR COVID-19 VIRUS: Primary | ICD-10-CM

## 2020-07-23 NOTE — TELEPHONE ENCOUNTER
Called patient to conf procedure for 08/06/20 patient requested to reschedule due to being in vacation in [facility] the week prior   She is now scheduled for 10/01/20 with Dr Deleon Chol she is aware to complete COVID test 10 days prior

## 2020-08-06 ENCOUNTER — ANESTHESIA (OUTPATIENT)
Dept: GASTROENTEROLOGY | Facility: AMBULARY SURGERY CENTER | Age: 50
End: 2020-08-06

## 2020-09-08 DIAGNOSIS — Z12.39 BREAST CANCER SCREENING: ICD-10-CM

## 2020-09-21 DIAGNOSIS — K59.00 CONSTIPATION, UNSPECIFIED CONSTIPATION TYPE: Primary | ICD-10-CM

## 2020-09-21 RX ORDER — SODIUM, POTASSIUM,MAG SULFATES 17.5-3.13G
SOLUTION, RECONSTITUTED, ORAL ORAL
Qty: 4000 ML | Refills: 0 | Status: SHIPPED | OUTPATIENT
Start: 2020-09-21 | End: 2020-10-01 | Stop reason: ALTCHOICE

## 2020-09-22 ENCOUNTER — TELEPHONE (OUTPATIENT)
Dept: URGENT CARE | Facility: CLINIC | Age: 50
End: 2020-09-22

## 2020-09-22 ENCOUNTER — OFFICE VISIT (OUTPATIENT)
Dept: URGENT CARE | Facility: CLINIC | Age: 50
End: 2020-09-22
Payer: COMMERCIAL

## 2020-09-22 VITALS
TEMPERATURE: 98.2 F | WEIGHT: 145 LBS | SYSTOLIC BLOOD PRESSURE: 118 MMHG | OXYGEN SATURATION: 100 % | BODY MASS INDEX: 23.3 KG/M2 | RESPIRATION RATE: 16 BRPM | HEART RATE: 69 BPM | DIASTOLIC BLOOD PRESSURE: 70 MMHG | HEIGHT: 66 IN

## 2020-09-22 DIAGNOSIS — J01.90 ACUTE SINUSITIS, RECURRENCE NOT SPECIFIED, UNSPECIFIED LOCATION: Primary | ICD-10-CM

## 2020-09-22 PROCEDURE — G0382 LEV 3 HOSP TYPE B ED VISIT: HCPCS | Performed by: PHYSICIAN ASSISTANT

## 2020-09-22 PROCEDURE — S9083 URGENT CARE CENTER GLOBAL: HCPCS | Performed by: PHYSICIAN ASSISTANT

## 2020-09-22 RX ORDER — METHYLPREDNISOLONE 4 MG/1
TABLET ORAL
Qty: 1 EACH | Refills: 0 | Status: SHIPPED | OUTPATIENT
Start: 2020-09-22 | End: 2020-10-01 | Stop reason: ALTCHOICE

## 2020-09-22 NOTE — LETTER
September 22, 2020     Patient: Nicola Cristobal   YOB: 1970   Date of Visit: 9/22/2020       To Whom it May Concern:    Alejandro Ma was seen in my clinic on 9/22/2020  She may return to work on 9/23/20  If you have any questions or concerns, please don't hesitate to call           Sincerely,          Jose Luis Chacko PA-C        CC: No Recipients

## 2020-09-22 NOTE — PROGRESS NOTES
[facility]        NAME: Cari Peterson is a 44 y o  female  : 1970    MRN: [id number]  DATE: 2020  TIME: 12:33 PM    Assessment and Plan   Acute sinusitis, recurrence not specified, unspecified location [J01 90]  1  Acute sinusitis, recurrence not specified, unspecified location  methylPREDNISolone 4 MG tablet therapy pack     No indication for antibiotic at this time however we did discuss that if symptoms do not improve over the next week she will need to be reevaluated  Pt verbalized understanding  She is requesting something for the congestion and post nasal drip because otc medications are not working  Discussed use of steroid and pt states that is what she typically gets for these symptoms  Patient Instructions   Patient Instructions     Sinusitis   WHAT YOU NEED TO KNOW:   Sinusitis is inflammation or infection of your sinuses  It is most often caused by a virus  Acute sinusitis may last up to 12 weeks  Chronic sinusitis lasts longer than 12 weeks  Recurrent sinusitis means you have 4 or more times in 1 year  DISCHARGE INSTRUCTIONS:   Return to the emergency department if:   · Your eye and eyelid are red, swollen, and painful  · You cannot open your eye  · You have vision changes, such as double vision  · Your eyeball bulges out or you cannot move your eye  · You are more sleepy than normal, or you notice changes in your ability to think, move, or talk  · You have a stiff neck, a fever, or a bad headache  · You have swelling of your forehead or scalp  Contact your healthcare provider if:   · Your symptoms do not improve after 3 days  · Your symptoms do not go away after 10 days  · You have nausea and are vomiting  · Your nose is bleeding  · You have questions or concerns about your condition or care  Medicines: Your symptoms may go away on their own   Your healthcare provider may recommend watchful waiting for up to 10 days before starting antibiotics  You may  need any of the following:  · Acetaminophen  decreases pain and fever  It is available without a doctor's order  Ask how much to take and how often to take it  Follow directions  Read the labels of all other medicines you are using to see if they also contain acetaminophen, or ask your doctor or pharmacist  Acetaminophen can cause liver damage if not taken correctly  Do not use more than 4 grams (4,000 milligrams) total of acetaminophen in one day  · NSAIDs , such as ibuprofen, help decrease swelling, pain, and fever  This medicine is available with or without a doctor's order  NSAIDs can cause stomach bleeding or kidney problems in certain people  If you take blood thinner medicine, always ask your healthcare provider if NSAIDs are safe for you  Always read the medicine label and follow directions  · Nasal steroid sprays  may help decrease inflammation in your nose and sinuses  · Decongestants  help reduce swelling and drain mucus in the nose and sinuses  They may help you breathe easier  · Antihistamines  help dry mucus in the nose and relieve sneezing  · Antibiotics  help treat or prevent a bacterial infection  · Take your medicine as directed  Contact your healthcare provider if you think your medicine is not helping or if you have side effects  Tell him or her if you are allergic to any medicine  Keep a list of the medicines, vitamins, and herbs you take  Include the amounts, and when and why you take them  Bring the list or the pill bottles to follow-up visits  Carry your medicine list with you in case of an emergency  Self-care:   · Rinse your sinuses  Use a sinus rinse device to rinse your nasal passages with a saline (salt water) solution or distilled water  Do not use tap water  This will help thin the mucus in your nose and rinse away pollen and dirt  It will also help reduce swelling so you can breathe normally   Ask your healthcare provider how often to do this  · Breathe in steam   Heat a bowl of water until you see steam  Lean over the bowl and make a tent over your head with a large towel  Breathe deeply for about 20 minutes  Be careful not to get too close to the steam or burn yourself  Do this 3 times a day  You can also breathe deeply when you take a hot shower  · Sleep with your head elevated  Place an extra pillow under your head before you go to sleep to help your sinuses drain  · Drink liquids as directed  Ask your healthcare provider how much liquid to drink each day and which liquids are best for you  Liquids will thin the mucus in your nose and help it drain  Avoid drinks that contain alcohol or caffeine  · Do not smoke, and avoid secondhand smoke  Nicotine and other chemicals in cigarettes and cigars can make your symptoms worse  Ask your healthcare provider for information if you currently smoke and need help to quit  E-cigarettes or smokeless tobacco still contain nicotine  Talk to your healthcare provider before you use these products  Prevent the spread of germs that cause sinusitis:  Wash your hands often with soap and water  Wash your hands after you use the bathroom, change a child's diaper, or sneeze  Wash your hands before you prepare or eat food  Follow up with your healthcare provider as directed: You may be referred to an ear, nose, and throat specialist  Write down your questions so you remember to ask them during your visits  © 2017 [employer] Information is for End User's use only and may not be sold, redistributed or otherwise used for commercial purposes  All illustrations and images included in CareNotes® are the copyrighted property of A D [employer] M [employer] Inc  or [employer]  The above information is an  only  It is not intended as medical advice for individual conditions or treatments   Talk to your doctor, nurse or pharmacist before following any medical regimen to see if it is safe and effective for you  Proceed to  ER if symptoms worsen  Chief Complaint     Chief Complaint   Patient presents with    Sinusitis     Started sunday with nasal congestion, post nasal drip  Denies headache, SOB, fever, chills  History of Present Illness       Patient presents for evaluation of sinus congestion and pressure which has been worsening for the past week  She states that she has seasonal allergies and has been using Zyrtec and Flonase but this past Sunday her symptoms exponentially worsened  She complains of pressure in her sinuses and post nasal drainage  No fever of chills  Review of Systems   Review of Systems   Constitutional: Negative for chills and fever  HENT: Positive for congestion, postnasal drip and rhinorrhea  Respiratory: Negative  Cardiovascular: Negative  Musculoskeletal: Negative  Skin: Negative  Neurological: Negative            Current Medications       Current Outpatient Medications:     albuterol (PROAIR HFA) 90 mcg/act inhaler, Inhale 2 puffs every 4 (four) hours as needed for wheezing or shortness of breath (cough) (Patient not taking: Reported on 7/14/2020), Disp: 1 Inhaler, Rfl: 1    bisacodyl (DULCOLAX) 5 mg EC tablet, Take 1 tablet (5 mg total) by mouth daily as needed for constipation (Patient not taking: Reported on 7/14/2020), Disp: 90 each, Rfl: 0    cetirizine (ZyrTEC) 10 mg tablet, Take 10 mg by mouth daily, Disp: , Rfl:     methylPREDNISolone 4 MG tablet therapy pack, Use as directed on package, Disp: 1 each, Rfl: 0    mometasone (NASONEX) 50 mcg/act nasal spray, 2 sprays into each nostril daily, Disp: , Rfl:     Na Sulfate-K Sulfate-Mg Sulf (Suprep Bowel Prep Kit) 17 5-3 13-1 6 GM/177ML SOLN, Take as directed by the office, Disp: 4000 mL, Rfl: 0    polyethylene glycol (GLYCOLAX) powder, Take 17 g by mouth daily (Patient not taking: Reported on 7/14/2020), Disp: 510 g, Rfl: 2    SUPREP BOWEL PREP KIT 17 5-3 13-1 6 GM/177ML SOLN, Take 180 mL by mouth once for 1 dose, Disp: 180 mL, Rfl: 0    Current Facility-Administered Medications:     cyanocobalamin injection 1,000 mcg, 1,000 mcg, Intramuscular, Q30 Days, Teagan Barnett MD, 1,000 mcg at 18 1702    cyanocobalamin injection 1,000 mcg, 1,000 mcg, Intramuscular, Q30 Days, Teagan Barnett MD, 1,000 mcg at 18 1644    cyanocobalamin injection 1,000 mcg, 1,000 mcg, Intramuscular, Q30 Days, GONZALO Leyva, 1,000 mcg at 10/17/18 1717    cyanocobalamin injection 1,000 mcg, 1,000 mcg, Intramuscular, Q30 Days, Teagan Barnett MD, 1,000 mcg at 18 1632    cyanocobalamin injection 1,000 mcg, 1,000 mcg, Intramuscular, Q30 Days, GONZALO Leyva, 1,000 mcg at 19 1613    cyanocobalamin injection 1,000 mcg, 1,000 mcg, Intramuscular, Q30 Days, Teagan Barnett MD, 1,000 mcg at 19 1404    cyanocobalamin injection 1,000 mcg, 1,000 mcg, Intramuscular, Q30 Days, Teagan Barnett MD, 1,000 mcg at 19 1625    cyanocobalamin injection 1,000 mcg, 1,000 mcg, Intramuscular, Q30 Days, Teagan Barnett MD, 1,000 mcg at 10/09/19 1704    cyanocobalamin injection 1,000 mcg, 1,000 mcg, Intramuscular, Q30 Days, GONZALO Leyva, 1,000 mcg at 19 1617    Current Allergies     Allergies as of 2020 - Reviewed 2020   Allergen Reaction Noted    Sulfa antibiotics Hives 2013            The following portions of the patient's history were reviewed and updated as appropriate: allergies, current medications, past family history, past medical history, past social history, past surgical history and problem list      Past Medical History:   Diagnosis Date    Bronchospasm     Chest pain     Conjunctivitis     Dizziness     Middle ear effusion, right     last assessed-2017    Onychomycosis     UTI symptoms 2020    Vertigo        Past Surgical History:   Procedure Laterality Date     SECTION, LOW TRANSVERSE      OTHER SURGICAL HISTORY      Angiography pulmonary-11/27/2007- no evidence of pulmonary embolus, no evidence of occult pulmonary disease, 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 just lateral to the costovertebral junction      OTHER SURGICAL HISTORY      ECG normal variant-9/13/2007       Family History   Problem Relation Age of Onset    Skin cancer Mother         melanoma    Thyroid disease Mother     Coronary artery disease Father     Polymyalgia rheumatica Paternal Uncle          Medications have been verified  Objective   /70   Pulse 69   Temp 98 2 °F (36 8 °C)   Resp 16   Ht 5' 6" (1 676 m)   Wt 65 8 kg (145 lb)   SpO2 100%   BMI 23 40 kg/m²        Physical Exam     Physical Exam  Vitals signs reviewed  Constitutional:       General: She is not in acute distress  HENT:      Right Ear: Tympanic membrane normal       Left Ear: Tympanic membrane normal       Nose: Congestion present  Right Sinus: No maxillary sinus tenderness or frontal sinus tenderness  Left Sinus: No maxillary sinus tenderness or frontal sinus tenderness  Mouth/Throat:      Comments: Post nasal drainage  Eyes:      Conjunctiva/sclera: Conjunctivae normal    Cardiovascular:      Rate and Rhythm: Normal rate and regular rhythm  Pulmonary:      Effort: Pulmonary effort is normal       Breath sounds: Normal breath sounds  Neurological:      Mental Status: She is alert

## 2020-09-22 NOTE — PATIENT INSTRUCTIONS

## 2020-09-30 ENCOUNTER — TELEPHONE (OUTPATIENT)
Dept: GASTROENTEROLOGY | Facility: AMBULARY SURGERY CENTER | Age: 50
End: 2020-09-30

## 2020-09-30 NOTE — TELEPHONE ENCOUNTER
Patients GI provider:  Dr Phillip Gonzalez    Number to return call: (    [phone]    Reason for call: Pt had walnuts and grapes on Monday and tueday, will this be an issue?     Scheduled procedure/appointment date if applicable: Apt/procedure 10-1-20 colon / Olevia Leaks

## 2020-09-30 NOTE — TELEPHONE ENCOUNTER
LMOM for pt informing her that I did speak with one of our physicians and they said she should be fine for her procedure on 10/01 and reminding her of her clear liquid diet today

## 2020-10-01 ENCOUNTER — HOSPITAL ENCOUNTER (OUTPATIENT)
Dept: GASTROENTEROLOGY | Facility: AMBULARY SURGERY CENTER | Age: 50
Setting detail: OUTPATIENT SURGERY
Discharge: HOME | End: 2020-10-01
Attending: INTERNAL MEDICINE | Admitting: INTERNAL MEDICINE
Payer: COMMERCIAL

## 2020-10-01 VITALS
HEART RATE: 63 BPM | OXYGEN SATURATION: 100 % | SYSTOLIC BLOOD PRESSURE: 106 MMHG | WEIGHT: 137 LBS | BODY MASS INDEX: 22.02 KG/M2 | RESPIRATION RATE: 18 BRPM | HEIGHT: 66 IN | TEMPERATURE: 97.4 F | DIASTOLIC BLOOD PRESSURE: 65 MMHG

## 2020-10-01 VITALS — HEART RATE: 66 BPM

## 2020-10-01 DIAGNOSIS — K59.00 CONSTIPATION, UNSPECIFIED CONSTIPATION TYPE: ICD-10-CM

## 2020-10-01 LAB
EXT PREGNANCY TEST URINE: NEGATIVE
EXT. CONTROL: NORMAL

## 2020-10-01 PROCEDURE — 81025 URINE PREGNANCY TEST: CPT | Performed by: ANESTHESIOLOGY

## 2020-10-01 PROCEDURE — 45378 DIAGNOSTIC COLONOSCOPY: CPT | Performed by: INTERNAL MEDICINE

## 2020-10-01 RX ORDER — LIDOCAINE HYDROCHLORIDE 10 MG/ML
0.5 INJECTION, SOLUTION EPIDURAL; INFILTRATION; INTRACAUDAL; PERINEURAL ONCE AS NEEDED
Status: DISCONTINUED | OUTPATIENT
Start: 2020-10-01 | End: 2020-10-05 | Stop reason: HOSPADM

## 2020-10-01 RX ORDER — SODIUM CHLORIDE, SODIUM LACTATE, POTASSIUM CHLORIDE, CALCIUM CHLORIDE 600; 310; 30; 20 MG/100ML; MG/100ML; MG/100ML; MG/100ML
125 INJECTION, SOLUTION INTRAVENOUS CONTINUOUS
Status: DISCONTINUED | OUTPATIENT
Start: 2020-10-01 | End: 2020-10-05 | Stop reason: HOSPADM

## 2020-10-01 RX ORDER — PROPOFOL 10 MG/ML
INJECTION, EMULSION INTRAVENOUS AS NEEDED
Status: DISCONTINUED | OUTPATIENT
Start: 2020-10-01 | End: 2020-10-01

## 2020-10-01 RX ADMIN — PROPOFOL 30 MG: 10 INJECTION, EMULSION INTRAVENOUS at 11:28

## 2020-10-01 RX ADMIN — PROPOFOL 30 MG: 10 INJECTION, EMULSION INTRAVENOUS at 11:22

## 2020-10-01 RX ADMIN — PROPOFOL 30 MG: 10 INJECTION, EMULSION INTRAVENOUS at 11:17

## 2020-10-01 RX ADMIN — PROPOFOL 30 MG: 10 INJECTION, EMULSION INTRAVENOUS at 11:19

## 2020-10-01 RX ADMIN — PROPOFOL 30 MG: 10 INJECTION, EMULSION INTRAVENOUS at 11:30

## 2020-10-01 RX ADMIN — PROPOFOL 30 MG: 10 INJECTION, EMULSION INTRAVENOUS at 11:25

## 2020-10-01 RX ADMIN — PROPOFOL 100 MG: 10 INJECTION, EMULSION INTRAVENOUS at 11:15

## 2020-10-01 RX ADMIN — SODIUM CHLORIDE, SODIUM LACTATE, POTASSIUM CHLORIDE, AND CALCIUM CHLORIDE: .6; .31; .03; .02 INJECTION, SOLUTION INTRAVENOUS at 11:08

## 2020-10-01 RX ADMIN — PROPOFOL 30 MG: 10 INJECTION, EMULSION INTRAVENOUS at 11:32

## 2020-10-01 RX ADMIN — PROPOFOL 30 MG: 10 INJECTION, EMULSION INTRAVENOUS at 11:34

## 2020-10-01 RX ADMIN — PROPOFOL 30 MG: 10 INJECTION, EMULSION INTRAVENOUS at 11:18

## 2021-03-26 DIAGNOSIS — Z23 ENCOUNTER FOR IMMUNIZATION: ICD-10-CM

## 2021-06-24 DIAGNOSIS — Z12.11 SCREENING FOR COLON CANCER: Primary | ICD-10-CM

## 2021-09-27 ENCOUNTER — TELEPHONE (OUTPATIENT)
Dept: FAMILY MEDICINE CLINIC | Facility: CLINIC | Age: 51
End: 2021-09-27

## 2021-09-27 DIAGNOSIS — J01.91 ACUTE RECURRENT SINUSITIS, UNSPECIFIED LOCATION: ICD-10-CM

## 2021-09-27 RX ORDER — ALBUTEROL SULFATE 90 UG/1
2 AEROSOL, METERED RESPIRATORY (INHALATION) EVERY 4 HOURS PRN
Qty: 8.5 G | Refills: 2 | Status: SHIPPED | OUTPATIENT
Start: 2021-09-27

## 2021-09-27 NOTE — TELEPHONE ENCOUNTER
----- Message from Haydee Lopez LPN sent at   8:46 AM EDT -----  Regarding: FW: Prescription Question  Contact: [phone]  Chart reviewed, not on pts current med panel   ----- Message -----  From: Bing Perez  Sent: 2021   2:57 PM EDT  To: Dung Sampson [facility]  Subject: Prescription Question                            My prescription for the proair inhaler is   Can you please send a new script to [facility] in [facility] or would you like me to come in for an appointment?     Thanks,  Guille Cristina

## 2021-10-28 ENCOUNTER — TELEPHONE (OUTPATIENT)
Dept: GASTROENTEROLOGY | Facility: HOSPITAL | Age: 51
End: 2021-10-28

## 2021-10-29 ENCOUNTER — ANESTHESIA EVENT (OUTPATIENT)
Dept: GASTROENTEROLOGY | Facility: HOSPITAL | Age: 51
End: 2021-10-29

## 2021-10-29 ENCOUNTER — ANESTHESIA (OUTPATIENT)
Dept: GASTROENTEROLOGY | Facility: HOSPITAL | Age: 51
End: 2021-10-29

## 2021-10-29 ENCOUNTER — HOSPITAL ENCOUNTER (OUTPATIENT)
Dept: GASTROENTEROLOGY | Facility: HOSPITAL | Age: 51
Setting detail: OUTPATIENT SURGERY
Discharge: HOME | End: 2021-10-29
Attending: COLON & RECTAL SURGERY | Admitting: COLON & RECTAL SURGERY
Payer: COMMERCIAL

## 2021-10-29 VITALS
HEIGHT: 66 IN | BODY MASS INDEX: 22.5 KG/M2 | WEIGHT: 140 LBS | HEART RATE: 64 BPM | OXYGEN SATURATION: 100 % | SYSTOLIC BLOOD PRESSURE: 123 MMHG | RESPIRATION RATE: 18 BRPM | TEMPERATURE: 97.8 F | DIASTOLIC BLOOD PRESSURE: 69 MMHG

## 2021-10-29 DIAGNOSIS — K59.00 CONSTIPATION, UNSPECIFIED CONSTIPATION TYPE: ICD-10-CM

## 2021-10-29 PROCEDURE — 99243 OFF/OP CNSLTJ NEW/EST LOW 30: CPT | Performed by: COLON & RECTAL SURGERY

## 2021-10-29 PROCEDURE — 45380 COLONOSCOPY AND BIOPSY: CPT | Performed by: COLON & RECTAL SURGERY

## 2021-10-29 PROCEDURE — 88305 TISSUE EXAM BY PATHOLOGIST: CPT | Performed by: PATHOLOGY

## 2021-10-29 RX ORDER — PROPOFOL 10 MG/ML
INJECTION, EMULSION INTRAVENOUS CONTINUOUS PRN
Status: DISCONTINUED | OUTPATIENT
Start: 2021-10-29 | End: 2021-10-29

## 2021-10-29 RX ORDER — PROPOFOL 10 MG/ML
INJECTION, EMULSION INTRAVENOUS AS NEEDED
Status: DISCONTINUED | OUTPATIENT
Start: 2021-10-29 | End: 2021-10-29

## 2021-10-29 RX ORDER — SODIUM CHLORIDE 9 MG/ML
INJECTION, SOLUTION INTRAVENOUS CONTINUOUS PRN
Status: DISCONTINUED | OUTPATIENT
Start: 2021-10-29 | End: 2021-10-29

## 2021-10-29 RX ADMIN — PROPOFOL 40 MG: 10 INJECTION, EMULSION INTRAVENOUS at 11:14

## 2021-10-29 RX ADMIN — SODIUM CHLORIDE: 0.9 INJECTION, SOLUTION INTRAVENOUS at 11:09

## 2021-10-29 RX ADMIN — PROPOFOL 80 MCG/KG/MIN: 10 INJECTION, EMULSION INTRAVENOUS at 11:12

## 2021-10-29 RX ADMIN — PROPOFOL 80 MG: 10 INJECTION, EMULSION INTRAVENOUS at 11:12

## 2022-02-02 ENCOUNTER — ANNUAL EXAM (OUTPATIENT)
Dept: OBGYN CLINIC | Facility: CLINIC | Age: 52
End: 2022-02-02
Payer: COMMERCIAL

## 2022-02-02 VITALS
HEIGHT: 66 IN | DIASTOLIC BLOOD PRESSURE: 84 MMHG | SYSTOLIC BLOOD PRESSURE: 120 MMHG | WEIGHT: 147.2 LBS | BODY MASS INDEX: 23.66 KG/M2

## 2022-02-02 DIAGNOSIS — Z01.419 WOMEN'S ANNUAL ROUTINE GYNECOLOGICAL EXAMINATION: Primary | ICD-10-CM

## 2022-02-02 DIAGNOSIS — Z12.31 ENCOUNTER FOR SCREENING MAMMOGRAM FOR MALIGNANT NEOPLASM OF BREAST: ICD-10-CM

## 2022-02-02 PROCEDURE — S0612 ANNUAL GYNECOLOGICAL EXAMINA: HCPCS | Performed by: OBSTETRICS & GYNECOLOGY

## 2022-02-02 PROCEDURE — G0476 HPV COMBO ASSAY CA SCREEN: HCPCS | Performed by: OBSTETRICS & GYNECOLOGY

## 2022-02-02 PROCEDURE — G0145 SCR C/V CYTO,THINLAYER,RESCR: HCPCS | Performed by: OBSTETRICS & GYNECOLOGY

## 2022-02-02 NOTE — PROGRESS NOTES
Assessment/Plan:    The patient was informed of a stable suma menopausal gyn examination  She will watch for true menopause symptoms  She should return my office in 1 year  She will continue get yearly mammograms  She is content with her weight feels safe at home  Subjective:      Patient ID: Teagan Kumar is a 46 y o  female  HPI    This is a 68-year-old white female, she is a  2 para 2 with 2 prior  sections  Her menstrual cycles still pretty regular  No signs of true menopausal at the present time  She denies any problems hot flashes and night sweats  She is still sexually active with not issues  Her current method of contraception includes condoms  She denies any major  GI complaints  Colonoscopies are up-to-date  There are no new major family illnesses report  She has a dentist on a regular basis  Denies any problem depression  She has a history of asthma which is under control medically  Denies any problem with her  scar  She will continue to get yearly mammograms  She feels safe at home  The following portions of the patient's history were reviewed and updated as appropriate: allergies, current medications, past family history, past medical history, past social history, past surgical history and problem list     Review of Systems   All other systems reviewed and are negative  Objective:      /84   Ht 5' 6" (1 676 m)   Wt 66 8 kg (147 lb 3 2 oz)   LMP 01/10/2022 (Exact Date)   BMI 23 76 kg/m²          Physical Exam  Vitals reviewed  Exam conducted with a chaperone present  Constitutional:       Appearance: Normal appearance  She is normal weight  HENT:      Head: Normocephalic and atraumatic  Eyes:      Extraocular Movements: Extraocular movements intact  Cardiovascular:      Rate and Rhythm: Normal rate  Pulmonary:      Effort: Pulmonary effort is normal       Breath sounds: Normal breath sounds     Chest:   Breasts: Right: Normal  No swelling, bleeding, inverted nipple, mass, nipple discharge, skin change, tenderness, axillary adenopathy or supraclavicular adenopathy  Left: Normal  No swelling, bleeding, inverted nipple, mass, nipple discharge, skin change, tenderness, axillary adenopathy or supraclavicular adenopathy  Abdominal:      General: Abdomen is flat  A surgical scar is present  Bowel sounds are normal  There is no distension  Palpations: Abdomen is soft  There is no hepatomegaly, splenomegaly, mass or pulsatile mass  Tenderness: There is no abdominal tenderness  There is no guarding  Hernia: No hernia is present  There is no hernia in the umbilical area, ventral area, left inguinal area or right inguinal area  Comments:  section scar well healed  Genitourinary:     General: Normal vulva  Pubic Area: No rash or pubic lice  Labia:         Right: No rash, tenderness, lesion or injury  Left: No rash, tenderness, lesion or injury  Urethra: No prolapse, urethral pain, urethral swelling or urethral lesion  Vagina: Normal       Cervix: Normal  No eversion  Uterus: Normal  Not deviated, not enlarged, not fixed, not tender and no uterine prolapse  Adnexa: Right adnexa normal         Right: No mass, tenderness or fullness  Left: No mass, tenderness or fullness  Rectum: Normal       Comments: The external genitalia normal limits the vagina is clean the cervix closed  A Pap smear was performed  The uterus is anterior normal size is no cervical motion tenderness  The adnexa clear bilaterally  There was no evidence of prolapse of the bladder or the uterus or the urethra  Musculoskeletal:         General: Normal range of motion  Cervical back: Normal range of motion and neck supple  Lymphadenopathy:      Upper Body:      Right upper body: No supraclavicular or axillary adenopathy        Left upper body: No supraclavicular or axillary adenopathy  Skin:     General: Skin is warm and dry  Neurological:      General: No focal deficit present  Mental Status: She is alert and oriented to person, place, and time  Psychiatric:         Mood and Affect: Mood normal          Behavior: Behavior normal          Thought Content:  Thought content normal

## 2022-02-02 NOTE — PATIENT INSTRUCTIONS
The patient was informed of a stable suma menopausal gyn examination  She will continue use condoms  She return my office in 1 year  She will continue yearly mammograms  If other issues or problems arise she will call me

## 2022-02-04 LAB
HPV HR 12 DNA CVX QL NAA+PROBE: NEGATIVE
HPV16 DNA CVX QL NAA+PROBE: NEGATIVE
HPV18 DNA CVX QL NAA+PROBE: NEGATIVE

## 2022-02-10 LAB
LAB AP GYN PRIMARY INTERPRETATION: NORMAL
LAB AP LMP: NORMAL
Lab: NORMAL

## 2022-03-28 ENCOUNTER — OFFICE VISIT (OUTPATIENT)
Dept: FAMILY MEDICINE CLINIC | Facility: CLINIC | Age: 52
End: 2022-03-28
Payer: COMMERCIAL

## 2022-03-28 VITALS
HEART RATE: 73 BPM | TEMPERATURE: 97.6 F | RESPIRATION RATE: 16 BRPM | DIASTOLIC BLOOD PRESSURE: 64 MMHG | SYSTOLIC BLOOD PRESSURE: 116 MMHG | OXYGEN SATURATION: 100 % | BODY MASS INDEX: 23.27 KG/M2 | WEIGHT: 144.8 LBS | HEIGHT: 66 IN

## 2022-03-28 DIAGNOSIS — J30.89 ALLERGIC RHINITIS DUE TO OTHER ALLERGIC TRIGGER, UNSPECIFIED SEASONALITY: ICD-10-CM

## 2022-03-28 DIAGNOSIS — R53.83 FATIGUE, UNSPECIFIED TYPE: ICD-10-CM

## 2022-03-28 DIAGNOSIS — J01.91 ACUTE RECURRENT SINUSITIS, UNSPECIFIED LOCATION: Primary | ICD-10-CM

## 2022-03-28 DIAGNOSIS — Z13.220 ENCOUNTER FOR SCREENING FOR LIPID DISORDER: ICD-10-CM

## 2022-03-28 DIAGNOSIS — E55.9 VITAMIN D DEFICIENCY: ICD-10-CM

## 2022-03-28 PROCEDURE — 3725F SCREEN DEPRESSION PERFORMED: CPT | Performed by: FAMILY MEDICINE

## 2022-03-28 PROCEDURE — 1036F TOBACCO NON-USER: CPT | Performed by: FAMILY MEDICINE

## 2022-03-28 PROCEDURE — 3008F BODY MASS INDEX DOCD: CPT | Performed by: FAMILY MEDICINE

## 2022-03-28 PROCEDURE — 99213 OFFICE O/P EST LOW 20 MIN: CPT | Performed by: FAMILY MEDICINE

## 2022-03-28 RX ORDER — AMOXICILLIN AND CLAVULANATE POTASSIUM 875; 125 MG/1; MG/1
1 TABLET, FILM COATED ORAL
Qty: 20 TABLET | Refills: 0 | Status: SHIPPED | OUTPATIENT
Start: 2022-03-28 | End: 2022-04-07

## 2022-03-28 RX ORDER — MONTELUKAST SODIUM 10 MG/1
10 TABLET ORAL
Qty: 30 TABLET | Refills: 2 | Status: SHIPPED | OUTPATIENT
Start: 2022-03-28

## 2022-03-28 RX ORDER — METHYLPREDNISOLONE 4 MG/1
TABLET ORAL
Qty: 21 EACH | Refills: 0 | Status: SHIPPED | OUTPATIENT
Start: 2022-03-28

## 2022-03-28 RX ORDER — LEVOCETIRIZINE DIHYDROCHLORIDE 5 MG/1
5 TABLET, FILM COATED ORAL EVERY EVENING
Qty: 30 TABLET | Refills: 3 | Status: SHIPPED | OUTPATIENT
Start: 2022-03-28

## 2022-03-28 RX ORDER — METRONIDAZOLE 7.5 MG/G
LOTION TOPICAL
COMMUNITY
Start: 2022-03-08

## 2022-03-28 NOTE — PROGRESS NOTES
FAMILY PRACTICE OFFICE VISIT       NAME: Juliane Garner  AGE: 46 y o  SEX: female       : 1970        MRN: [id number]        Assessment and Plan     1  Acute recurrent sinusitis, unspecified location  -     amoxicillin-clavulanate (Augmentin) 875-125 mg per tablet; Take 1 tablet by mouth 2 (two) times daily after meals for 10 days  -     methylPREDNISolone 4 MG tablet therapy pack; Use as directed on package    2  Allergic rhinitis due to other allergic trigger, unspecified seasonality  -     montelukast (SINGULAIR) 10 mg tablet; Take 1 tablet (10 mg total) by mouth daily at bedtime  -     levocetirizine (XYZAL) 5 MG tablet; Take 1 tablet (5 mg total) by mouth every evening    3  Vitamin D deficiency  -     Vitamin D 25 hydroxy; Future    4  Fatigue, unspecified type  -     CBC and differential; Future  -     Comprehensive metabolic panel; Future  -     TSH, 3rd generation; Future  -     Vitamin B12; Future    5  Encounter for screening for lipid disorder  -     Lipid Panel with Direct LDL reflex; Future      Patient presents for evaluation of acute sinusitis  Longstanding history of seasonal allergies  She has not been able to control her symptoms with daily Zyrtec, Flonase and Sudafed  She presents with significant sinus pressure, postnasal drip for over a week  At this time I recommend to start Augmentin for 7 to 10 days along with Medrol Dosepak  Continue Xyzal, try singular for chronic seasonal allergies  Continue saline rinses and Flonase  Patient will stop Sudafed    Patient will contact me in a few days if symptoms are not improving  She is due for routine labs in physical, orders provided, she will set up later  There are no Patient Instructions on file for this visit  Return if symptoms worsen or fail to improve, for follow up  Discussed with the patient and all questioned fully answered  She will call me if any problems arise       M*Modal software was used to dictate this note  It may contain errors with dictating incorrect words/spelling  Please contact provider directly with any questions  Chief Complaint   No chief complaint on file  History of Present Illness      SINUS SYMPTOMS  FOR OVER A WEEK   No fever   Some cough,  mild, due to PND   No ST   Feels VERY congested, sinus pressure   No chest tightness or wheezing  On Zyrtec/Xyzal with no significant improvement  Patient also tried Sudafed, Flonase and saline nasal rinses  Review of Systems   Review of Systems   Constitutional: Positive for fatigue  Negative for fever  HENT: Negative  Eyes: Negative  Respiratory: Negative  Cardiovascular: Negative  Gastrointestinal: Negative  Endocrine: Negative  Genitourinary: Negative  Musculoskeletal: Negative  Skin: Negative  Allergic/Immunologic: Negative  Neurological: Negative  Hematological: Negative  Psychiatric/Behavioral: Negative          Active Problem List     Patient Active Problem List   Diagnosis    Vitamin D deficiency    Vitamin B12 deficiency    Allergic rhinitis    UTI due to extended-spectrum beta lactamase (ESBL) producing Escherichia coli    Constipation       Past Medical History:  Past Medical History:   Diagnosis Date    Bronchospasm     Chest pain     Conjunctivitis     Dizziness     Middle ear effusion, right     last assessed-2017    Onychomycosis     UTI symptoms 2020    Vertigo        Past Surgical History:  Past Surgical History:   Procedure Laterality Date     SECTION, LOW TRANSVERSE      OTHER SURGICAL HISTORY      Angiography pulmonary-2007- no evidence of pulmonary embolus, no evidence of occult pulmonary disease, 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 just lateral to the costovertebral junction      OTHER SURGICAL HISTORY      ECG normal variant-2007       Family History:  Family History   Problem Relation Age of Onset    Skin cancer Mother         melanoma    Thyroid disease Mother     Coronary artery disease Father     Polymyalgia rheumatica Paternal Uncle        Social History:  Social History     Socioeconomic History    Marital status: /Civil Union     Spouse name: Not on file    Number of children: Not on file    Years of education: Not on file    Highest education level: Not on file   Occupational History     Comment: working full time   Tobacco Use    Smoking status: Never Smoker    Smokeless tobacco: Never Used   Vaping Use    Vaping Use: Never used   Substance and Sexual Activity    Alcohol use: Yes     Comment: social    Drug use: No    Sexual activity: Yes     Partners: Male     Birth control/protection: Condom Male   Other Topics Concern    Not on file   Social History Narrative    Not on file     Social Determinants of Health     Financial Resource Strain: Not on file   Food Insecurity: Not on file   Transportation Needs: Not on file   Physical Activity: Not on file   Stress: Not on file   Social Connections: Not on file   Intimate Partner Violence: Not on file   Housing Stability: Not on file         Objective     Vitals:    03/28/22 1140   BP: 116/64   BP Location: Left arm   Patient Position: Sitting   Cuff Size: Standard   Pulse: 73   Resp: 16   Temp: 97 6 °F (36 4 °C)   TempSrc: Temporal   SpO2: 100%   Weight: 65 7 kg (144 lb 12 8 oz)   Height: 5' 6" (1 676 m)       Wt Readings from Last 3 Encounters:   03/28/22 65 7 kg (144 lb 12 8 oz)   02/02/22 66 8 kg (147 lb 3 2 oz)   10/29/21 63 5 kg (140 lb)       Physical Exam  Vitals and nursing note reviewed  Constitutional:       General: She is not in acute distress  Appearance: Normal appearance  She is well-developed  She is not ill-appearing  HENT:      Head: Normocephalic and atraumatic  Right Ear: A middle ear effusion is present  Tympanic membrane is not erythematous        Left Ear: A middle ear effusion is present  Tympanic membrane is not erythematous  Nose: Mucosal edema present  Mouth/Throat:      Mouth: Mucous membranes are moist       Pharynx: Posterior oropharyngeal erythema ( postnasal drip) present  No oropharyngeal exudate  Eyes:      General: No scleral icterus  Conjunctiva/sclera: Conjunctivae normal    Cardiovascular:      Rate and Rhythm: Normal rate and regular rhythm  Heart sounds: Normal heart sounds  No murmur heard  Pulmonary:      Effort: Pulmonary effort is normal  No respiratory distress  Breath sounds: Normal breath sounds  No wheezing or rales  Musculoskeletal:      Cervical back: Neck supple  Lymphadenopathy:      Cervical: Cervical adenopathy (Submandibular) present  Skin:     General: Skin is warm  Neurological:      General: No focal deficit present  Mental Status: She is alert and oriented to person, place, and time  Cranial Nerves: No cranial nerve deficit  Deep Tendon Reflexes: Reflexes are normal and symmetric  Psychiatric:         Mood and Affect: Mood normal          Behavior: Behavior normal          Thought Content:  Thought content normal           Pertinent Laboratory/Diagnostic Studies:    Lab Results   Component Value Date    WBC 4 86 05/04/2019    HGB 12 8 05/04/2019    HCT 39 3 05/04/2019    MCV 99 (H) 05/04/2019     05/04/2019       No results found for: TSH    No results found for: CHOL  Lab Results   Component Value Date    TRIG 45 05/04/2019     Lab Results   Component Value Date    HDL 76 (H) 05/04/2019     Lab Results   Component Value Date    LDLCALC 53 05/04/2019     No results found for: HGBA1C  Lab Results   Component Value Date    SODIUM 138 05/04/2019    K 4 2 05/04/2019     05/04/2019    CO2 28 05/04/2019    AGAP 4 05/04/2019    BUN 16 05/04/2019    CREATININE 0 92 05/04/2019    GLUF 87 05/04/2019    CALCIUM 8 7 05/04/2019    AST 18 05/04/2019    ALT 19 05/04/2019    ALKPHOS 24 (L) 05/04/2019    TP 7 1 05/04/2019    TBILI 1 01 (H) 05/04/2019    EGFR 73 05/04/2019       Orders Placed This Encounter   Procedures    CBC and differential    Comprehensive metabolic panel    Lipid Panel with Direct LDL reflex    TSH, 3rd generation    Vitamin B12    Vitamin D 25 hydroxy       ALLERGIES:  Allergies   Allergen Reactions    Sulfa Antibiotics Hives       Current Medications     Current Outpatient Medications   Medication Sig Dispense Refill    albuterol (ProAir HFA) 90 mcg/act inhaler Inhale 2 puffs every 4 (four) hours as needed for wheezing or shortness of breath (cough) 8 5 g 2    Ascorbic Acid (VITAMIN C PO) Take by mouth in the morning      BIOTIN PO Take by mouth in the morning      Cyanocobalamin (VITAMIN B-12 PO) Take by mouth in the morning      METRONIDAZOLE, TOPICAL, 0 75 % LOTN APPLY TWICE A DAY TO FACE FOR ROSACEA CONTROL      mometasone (NASONEX) 50 mcg/act nasal spray 2 sprays into each nostril daily      VITAMIN D PO Take 1 capsule by mouth daily      amoxicillin-clavulanate (Augmentin) 875-125 mg per tablet Take 1 tablet by mouth 2 (two) times daily after meals for 10 days 20 tablet 0    levocetirizine (XYZAL) 5 MG tablet Take 1 tablet (5 mg total) by mouth every evening 30 tablet 3    methylPREDNISolone 4 MG tablet therapy pack Use as directed on package 21 each 0    montelukast (SINGULAIR) 10 mg tablet Take 1 tablet (10 mg total) by mouth daily at bedtime 30 tablet 2     Current Facility-Administered Medications   Medication Dose Route Frequency Provider Last Rate Last Admin    cyanocobalamin injection 1,000 mcg  1,000 mcg Intramuscular Q30 Days GONZALO Leyva   1,000 mcg at 06/05/19 1613    cyanocobalamin injection 1,000 mcg  1,000 mcg Intramuscular Q30 Days Estela Russell MD   1,000 mcg at 09/12/19 1625       Medications Discontinued During This Encounter   Medication Reason    cyanocobalamin injection 1,000 mcg     cyanocobalamin injection 1,000 mcg     cyanocobalamin injection 1,000 mcg     cyanocobalamin injection 1,000 mcg     cyanocobalamin injection 1,000 mcg     cyanocobalamin injection 1,000 mcg     cyanocobalamin injection 1,000 mcg     cetirizine (ZyrTEC) 10 mg tablet        Health Maintenance     Health Maintenance   Topic Date Due    Hepatitis C Screening  Never done    HIV Screening  Never done    Depression Screening  07/31/2020    Influenza Vaccine (1) 09/01/2021    Annual Physical  02/02/2023    BMI: Adult  03/28/2023    DTaP,Tdap,and Td Vaccines (2 - Td or Tdap) 06/20/2023    Breast Cancer Screening: Mammogram  09/13/2023    Colorectal Cancer Screening  10/28/2026    Cervical Cancer Screening  02/02/2027    COVID-19 Vaccine  Completed    Pneumococcal Vaccine: Pediatrics (0 to 5 Years) and At-Risk Patients (6 to 59 Years)  Aged Out    HIB Vaccine  Aged Out    Hepatitis B Vaccine  Aged Out    IPV Vaccine  Aged Out    Hepatitis A Vaccine  Aged Out    Meningococcal ACWY Vaccine  Aged Out    HPV Vaccine  Aged [employer] History   Administered Date(s) Administered    COVID-19 J[employer] ([employer] vaccine 0 5 mL 03/16/2021    COVID-19 MODERNA VACC 0 5 ML IM 12/03/2021    INFLUENZA 10/28/2019    Influenza, seasonal, injectable 12/05/2009    Influenza, seasonal, injectable, preservative free 11/01/2018    Tdap 06/20/2013       Rd Reeder MD

## 2022-09-14 DIAGNOSIS — Z12.31 ENCOUNTER FOR SCREENING MAMMOGRAM FOR MALIGNANT NEOPLASM OF BREAST: ICD-10-CM

## 2022-12-22 ENCOUNTER — TELEMEDICINE (OUTPATIENT)
Dept: FAMILY MEDICINE CLINIC | Facility: CLINIC | Age: 52
End: 2022-12-22

## 2022-12-22 VITALS — HEIGHT: 66 IN | WEIGHT: 145 LBS | BODY MASS INDEX: 23.3 KG/M2

## 2022-12-22 DIAGNOSIS — U07.1 COVID-19 VIRUS INFECTION: Primary | ICD-10-CM

## 2022-12-22 RX ORDER — NIRMATRELVIR AND RITONAVIR 300-100 MG
3 KIT ORAL 2 TIMES DAILY
Qty: 30 TABLET | Refills: 0 | Status: SHIPPED | OUTPATIENT
Start: 2022-12-22 | End: 2022-12-27

## 2022-12-22 NOTE — PROGRESS NOTES
COVID-19 Outpatient Progress Note    Assessment/Plan:    Problem List Items Addressed This Visit    None  Visit Diagnoses     COVID-19 virus infection    -  Primary    Relevant Medications    nirmatrelvir & ritonavir (Paxlovid, 300/100,) tablet therapy pack         Disposition:     Patient has asymptomatic or mild COVID-19 infection  Based off CDC guidelines, they were recommended to isolate for 5 days  If they are asymptomatic or symptoms are improving with no fevers in the past 24 hours, isolation may be ended followed by 5 days of wearing a mask when around othes to minimize risk of infecting others  If still have a fever or other symptoms have not improved, continue to isolate until they improve  Regardless of when they end isolation, avoid being around people who are more likely to get very sick from COVID-19 until at least day 11  I have spent 15 minutes directly with the patient  Greater than 50% of this time was spent in counseling/coordination of care regarding: instructions for management, patient and family education, risk factor reductions and impressions  Patient presents for evaluation of COVID-19 infection  Start Paxlovid  Continue symptomatic therapy with Tylenol, ibuprofen, gargling, Flonase, Xyzal   Force fluids  Patient will contact me in a few days if symptoms are not improving significantly  Isolation precautions discussed  Encounter provider: Na Dietrich MD     Provider located at: [address] [place] [id number]     Recent Visits  No visits were found meeting these conditions  Showing recent visits within past 7 days and meeting all other requirements  Today's Visits  Date Type Provider Dept   12/22/22 Telemedicine Na Dietrich MD [facility] [place] today's visits and meeting all other requirements  Future Appointments  No visits were found meeting these conditions    Showing future appointments within next 150 days and meeting all other requirements     This virtual check-in was done via [address] and patient was informed that this is a secure, HIPAA-compliant platform  She agrees to proceed  Patient agrees to participate in a virtual check in via telephone or video visit instead of presenting to the office to address urgent/immediate medical needs  Patient is aware this is a billable service  She acknowledged consent and understanding of privacy and security of the video platform  The patient has agreed to participate and understands they can discontinue the visit at any time  After connecting through [facility], the patient was identified by name and date of birth  Gianna Mena was informed that this was a telemedicine visit and that the exam was being conducted confidentially over secure lines  My office door was closed  No one else was in the room  Gianna Mena acknowledged consent and understanding of privacy and security of the telemedicine visit  I informed the patient that I have reviewed her record in Epic and presented the opportunity for her to ask any questions regarding the visit today  The patient agreed to participate  Verification of patient location:  Patient is located in the following state in which I hold an active license: [place]    Subjective:   Gianna Mena is a 45 y o  female who has been screened for COVID-19  Symptom change since last report: unchanged  Patient's symptoms include chills, fatigue, malaise, nasal congestion, sore throat, cough (dry), myalgias and headache  Patient denies fever, rhinorrhea, anosmia, loss of taste, shortness of breath, chest tightness, abdominal pain, nausea, vomiting and diarrhea  - Date of symptom onset: 12/18/2022  - Date of positive COVID-19 test: 12/20/2022  Type of test: Home antigen  Patient with typical symptoms of COVID-19 and they attest that they were positive on home rapid antigen testing   Image of positive result is not able to be uploaded into their chart  COVID-19 vaccination status: Fully vaccinated with booster    Priscilla Loredo has been staying home and has isolated themselves in her home  She is taking care to not share personal items and is cleaning all surfaces that are touched often, like counters, tabletops, and doorknobs using household cleaning sprays or wipes  She is wearing a mask when she leaves her room  New diagnosis of COVID-19  Patient is complaining of sore throat, sinus congestion, dry cough, chills, fatigue, hoarseness  Sore throat has been more bothersome within the past few days  Her appetite is fair  No trouble breathing  No requirements for albuterol inhaler so far  No results found for: Lacretia Keyon, SARSCORONAVI, CORONAVIRUSR, SARSCOVAG, [facility]    Review of Systems   Constitutional: Positive for chills and fatigue  Negative for fever  HENT: Positive for congestion and sore throat  Negative for rhinorrhea  Respiratory: Positive for cough (dry)  Negative for chest tightness and shortness of breath  Cardiovascular: Negative  Gastrointestinal: Negative for abdominal pain, diarrhea, nausea and vomiting  Musculoskeletal: Positive for myalgias  Neurological: Positive for headaches       Current Outpatient Medications on File Prior to Visit   Medication Sig   • albuterol (ProAir HFA) 90 mcg/act inhaler Inhale 2 puffs every 4 (four) hours as needed for wheezing or shortness of breath (cough)   • Ascorbic Acid (VITAMIN C PO) Take by mouth in the morning   • BIOTIN PO Take by mouth in the morning   • Cyanocobalamin (VITAMIN B-12 PO) Take by mouth in the morning   • levocetirizine (XYZAL) 5 MG tablet Take 1 tablet (5 mg total) by mouth every evening   • METRONIDAZOLE, TOPICAL, 0 75 % LOTN APPLY TWICE A DAY TO FACE FOR ROSACEA CONTROL   • mometasone (NASONEX) 50 mcg/act nasal spray 2 sprays into each nostril daily   • montelukast (SINGULAIR) 10 mg tablet Take 1 tablet (10 mg total) by mouth daily at bedtime   • VITAMIN D PO Take 1 capsule by mouth daily   • [DISCONTINUED] methylPREDNISolone 4 MG tablet therapy pack Use as directed on package       Objective:    Ht 5' 6" (1 676 m)   Wt 65 8 kg (145 lb)   BMI 23 40 kg/m²      Physical Exam  Constitutional:       General: She is not in acute distress  Appearance: Normal appearance  She is not ill-appearing  HENT:      Head: Normocephalic and atraumatic  Pulmonary:      Effort: Pulmonary effort is normal       Comments: Unlabored breathing  No tachypnea, cough or wheezing throughout exam   Patient is able to complete full sentences without cough  Neurological:      General: No focal deficit present  Mental Status: She is alert and oriented to person, place, and time  Psychiatric:         Mood and Affect: Mood normal          Behavior: Behavior normal          Thought Content:  Thought content normal        Sofía Cruz MD

## 2022-12-22 NOTE — PROGRESS NOTES
Name: Reyes Schneider      : 1970      MRN: [id number]  Encounter Provider: Ava Shabazz MD  Encounter Date: 2022   Encounter department: [address] [place] [address]      1  COVID-19 virus infection  -     nirmatrelvir & ritonavir (Paxlovid, 300/100,) tablet therapy pack;  Take 3 tablets by mouth 2 (two) times a day for 5 days Take 2 nirmatrelvir tablets + 1 ritonavir tablet together per dose         Subjective     HPI  Review of Systems    Past Medical History:   Diagnosis Date   • Bronchospasm    • Chest pain    • Conjunctivitis    • Dizziness    • Middle ear effusion, right     last assessed-2017   • Onychomycosis    • UTI symptoms 2020   • Vertigo      Past Surgical History:   Procedure Laterality Date   •  SECTION, LOW TRANSVERSE     • OTHER SURGICAL HISTORY      Angiography pulmonary-2007- no evidence of pulmonary embolus, no evidence of occult pulmonary disease, 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 just lateral to the costovertebral junction     • OTHER SURGICAL HISTORY      ECG normal variant-2007     Family History   Problem Relation Age of Onset   • Skin cancer Mother         melanoma   • Thyroid disease Mother    • Coronary artery disease Father    • Polymyalgia rheumatica Paternal Uncle      Social History     Socioeconomic History   • Marital status: /Civil Union     Spouse name: None   • Number of children: None   • Years of education: None   • Highest education level: None   Occupational History     Comment: working full time   Tobacco Use   • Smoking status: Never   • Smokeless tobacco: Never   Vaping Use   • Vaping Use: Never used   Substance and Sexual Activity   • Alcohol use: Yes     Comment: social   • Drug use: No   • Sexual activity: Yes     Partners: Male     Birth control/protection: Condom Male   Other Topics Concern   • None   Social History Narrative   • None Social Determinants of Health     Financial Resource Strain: Not on file   Food Insecurity: Not on file   Transportation Needs: Not on file   Physical Activity: Not on file   Stress: Not on file   Social Connections: Not on file   Intimate Partner Violence: Not on file   Housing Stability: Not on file     Current Outpatient Medications on File Prior to Visit   Medication Sig   • albuterol (ProAir HFA) 90 mcg/act inhaler Inhale 2 puffs every 4 (four) hours as needed for wheezing or shortness of breath (cough)   • Ascorbic Acid (VITAMIN C PO) Take by mouth in the morning   • BIOTIN PO Take by mouth in the morning   • Cyanocobalamin (VITAMIN B-12 PO) Take by mouth in the morning   • levocetirizine (XYZAL) 5 MG tablet Take 1 tablet (5 mg total) by mouth every evening   • METRONIDAZOLE, TOPICAL, 0 75 % LOTN APPLY TWICE A DAY TO FACE FOR ROSACEA CONTROL   • mometasone (NASONEX) 50 mcg/act nasal spray 2 sprays into each nostril daily   • montelukast (SINGULAIR) 10 mg tablet Take 1 tablet (10 mg total) by mouth daily at bedtime   • VITAMIN D PO Take 1 capsule by mouth daily   • [DISCONTINUED] methylPREDNISolone 4 MG tablet therapy pack Use as directed on package     Allergies   Allergen Reactions   • Sulfa Antibiotics Hives     Immunization History   Administered Date(s) Administered   • COVID-19 J&J (Mignon) vaccine 0 5 mL 03/16/2021   • COVID-19 MODERNA VACC 0 5 ML IM 12/03/2021   • INFLUENZA 10/28/2019   • Influenza, seasonal, injectable 12/05/2009   • Influenza, seasonal, injectable, preservative free 11/01/2018   • Tdap 06/20/2013       Objective     Ht 5' 6" (1 676 m)   Wt 65 8 kg (145 lb)   BMI 23 40 kg/m²     Physical Exam  Scarlett Connelly MD

## 2022-12-25 DIAGNOSIS — J30.89 ALLERGIC RHINITIS DUE TO OTHER ALLERGIC TRIGGER, UNSPECIFIED SEASONALITY: ICD-10-CM

## 2022-12-25 RX ORDER — LEVOCETIRIZINE DIHYDROCHLORIDE 5 MG/1
TABLET, FILM COATED ORAL
Qty: 30 TABLET | Refills: 3 | Status: SHIPPED | OUTPATIENT
Start: 2022-12-25 | End: 2022-12-28

## 2022-12-28 DIAGNOSIS — J30.89 ALLERGIC RHINITIS DUE TO OTHER ALLERGIC TRIGGER, UNSPECIFIED SEASONALITY: ICD-10-CM

## 2022-12-28 RX ORDER — LEVOCETIRIZINE DIHYDROCHLORIDE 5 MG/1
TABLET, FILM COATED ORAL
Qty: 90 TABLET | Refills: 2 | Status: SHIPPED | OUTPATIENT
Start: 2022-12-28

## 2022-12-30 ENCOUNTER — TELEPHONE (OUTPATIENT)
Dept: FAMILY MEDICINE CLINIC | Facility: CLINIC | Age: 52
End: 2022-12-30

## 2022-12-30 DIAGNOSIS — U07.1 COVID-19 VIRUS INFECTION: Primary | ICD-10-CM

## 2022-12-30 RX ORDER — FLUTICASONE PROPIONATE AND SALMETEROL 250; 50 UG/1; UG/1
1 POWDER RESPIRATORY (INHALATION) 2 TIMES DAILY
Qty: 60 BLISTER | Refills: 0 | Status: SHIPPED | OUTPATIENT
Start: 2022-12-30

## 2022-12-30 NOTE — TELEPHONE ENCOUNTER
It is quite challenging to give an advice without reevaluating patient  I would recommend to try Advair Diskus 1 puff twice a day    It is too late for patient to take Paxlovid now and she cannot combine it with Advair  If she develops symptoms of shortness of breath, chest tightness or fever that she should proceed to emergency room for further evaluation      Thank you

## 2022-12-30 NOTE — TELEPHONE ENCOUNTER
Patient called and is still dealing with a cough from COVID, she did not use the Paxlovid that was prescribed to her, she is wondering if there is anything that would be recommended for her  The cough tends to get worse at night, she is sleeping throughout the night  Please advise

## 2023-01-16 ENCOUNTER — OFFICE VISIT (OUTPATIENT)
Dept: OBGYN CLINIC | Facility: CLINIC | Age: 53
End: 2023-01-16

## 2023-01-16 VITALS
DIASTOLIC BLOOD PRESSURE: 80 MMHG | SYSTOLIC BLOOD PRESSURE: 118 MMHG | WEIGHT: 147.2 LBS | HEIGHT: 66 IN | BODY MASS INDEX: 23.66 KG/M2

## 2023-01-16 DIAGNOSIS — Z00.129 WELL ADOLESCENT VISIT WITHOUT ABNORMAL FINDINGS: Primary | ICD-10-CM

## 2023-01-16 NOTE — PROGRESS NOTES
This is a 57-year-old white female,  2 para 2 still having regular menstrual cycles  Her most recent period was 15 January  She is now concerned about the lost tampon  She states she put her last tampon a m  2 days ago and not sure she took it out  Vaginal examination shows the presence of menses  There is no evidence of a lost tampon  The cervix is seen  With the vagina was unremarkable  The patient was reassured there is no loss tampon  She has an appointment for yearly exam soon with me  No other issues were discussed

## 2023-01-16 NOTE — PATIENT INSTRUCTIONS
Patient was informed that there was no evidence of retained tampon  Her menses is normal slight now slowing up  She is reassured    She can like to see me sometime in the next few months for yearly exam

## 2023-01-26 DIAGNOSIS — U07.1 COVID-19 VIRUS INFECTION: ICD-10-CM

## 2023-01-26 RX ORDER — FLUTICASONE PROPIONATE AND SALMETEROL 50; 250 UG/1; UG/1
POWDER RESPIRATORY (INHALATION)
Qty: 60 BLISTER | Refills: 0 | Status: SHIPPED | OUTPATIENT
Start: 2023-01-26

## 2023-02-06 ENCOUNTER — ANNUAL EXAM (OUTPATIENT)
Dept: OBGYN CLINIC | Facility: CLINIC | Age: 53
End: 2023-02-06

## 2023-02-06 VITALS
WEIGHT: 148 LBS | SYSTOLIC BLOOD PRESSURE: 130 MMHG | HEIGHT: 66 IN | BODY MASS INDEX: 23.78 KG/M2 | DIASTOLIC BLOOD PRESSURE: 82 MMHG

## 2023-02-06 DIAGNOSIS — Z78.0 MENOPAUSE: Primary | ICD-10-CM

## 2023-02-06 DIAGNOSIS — Z12.31 ENCOUNTER FOR SCREENING MAMMOGRAM FOR MALIGNANT NEOPLASM OF BREAST: ICD-10-CM

## 2023-02-06 DIAGNOSIS — Z01.419 WOMEN'S ANNUAL ROUTINE GYNECOLOGICAL EXAMINATION: ICD-10-CM

## 2023-02-06 NOTE — PROGRESS NOTES
Assessment/Plan:    Patient was informed of a stable GYN examination  There was evidence of recent menses  Because of her age we have decided to check an [facility] and  to make sure she is not in menopause  I do not believe she is  I think she is we will space she will have her periods close to 59years old she was reassured  She will continue getting yearly mammograms  Colonoscopy is up-to-date  She feels safe at home  Is no problem with intimacy  She is still using condoms  There is no evidence of depression or anxiety  There are no new major family illnesses to report  She should return to my office in 1 year we will review her recent lab work which is ordered an [facility] and [address]  Subjective:      Patient ID: Mayra Dunn is a 45 y o  female  HPI  This is a 49-year-old white female, she is a  2 para 2 with 2 prior  sections  Her current method of contraception includes condoms  There is no problem with intimacy  She feels safe at home  She sees a dentist on a regular basis  She is content with her weight  Denies any prior depression or anxiety  Her colonoscopies are up-to-date  There are no new major family illnesses  Her mammograms are up-to-date  She denies any problems with her  section scar  The following portions of the patient's history were reviewed and updated as appropriate: allergies, current medications, past family history, past medical history, past social history, past surgical history and problem list     Review of Systems   HENT: Negative for postnasal drip  All other systems reviewed and are negative  Objective:      /82   Ht 5' 6" (1 676 m)   Wt 67 1 kg (148 lb)   LMP 2023 (Exact Date)   BMI 23 89 kg/m²          Physical Exam  Vitals reviewed  Exam conducted with a chaperone present  Constitutional:       Appearance: Normal appearance  She is normal weight  HENT:      Head: Normocephalic and atraumatic  Nose: Nose normal       Mouth/Throat:      Mouth: Mucous membranes are moist    Eyes:      Extraocular Movements: Extraocular movements intact  Pupils: Pupils are equal, round, and reactive to light  Cardiovascular:      Rate and Rhythm: Normal rate and regular rhythm  Pulses: Normal pulses  Heart sounds: Normal heart sounds  Pulmonary:      Effort: Pulmonary effort is normal       Breath sounds: Normal breath sounds  Chest:   Breasts:     Breasts are symmetrical       Right: Normal  No swelling, bleeding, inverted nipple, mass, nipple discharge, skin change or tenderness  Left: Normal  No swelling, bleeding, inverted nipple, mass, nipple discharge, skin change or tenderness  Abdominal:      General: Abdomen is flat  Bowel sounds are normal  There is no distension  Palpations: Abdomen is soft  There is no mass  Tenderness: There is no abdominal tenderness  There is no guarding or rebound  Hernia: No hernia is present  There is no hernia in the umbilical area, ventral area, left inguinal area or right inguinal area  Comments:  section scar well-healed x2   Genitourinary:     General: Normal vulva  Pubic Area: No rash or pubic lice  Labia:         Right: No rash, tenderness, lesion or injury  Left: No rash, tenderness, lesion or injury  Urethra: No prolapse, urethral pain, urethral swelling or urethral lesion  Vagina: No signs of injury and foreign body  No vaginal discharge, erythema, tenderness, lesions or prolapsed vaginal walls  Cervix: Normal       Uterus: Normal  Not deviated, not enlarged, not fixed, not tender and no uterine prolapse  Rectum: Normal       Comments: External genitalia normal limits the vagina is clean there is evidence of present menses  Cervix is closed  A Pap smear was not performed uterus is mid position normal size there is no cervical motion tenderness  There is no evidence of prolapse  The adnexa is clear bilaterally  Urethra and bladder normal working relationship  Musculoskeletal:         General: Normal range of motion  Cervical back: Normal range of motion and neck supple  Lymphadenopathy:      Upper Body:      Right upper body: No supraclavicular or axillary adenopathy  Left upper body: No supraclavicular or axillary adenopathy  Lower Body: No right inguinal adenopathy  No left inguinal adenopathy  Skin:     General: Skin is warm  Neurological:      General: No focal deficit present  Mental Status: She is alert and oriented to person, place, and time     Psychiatric:         Mood and Affect: Mood normal          Behavior: Behavior normal

## 2023-02-06 NOTE — PATIENT INSTRUCTIONS
The patient was informed of a stable GYN examination  We will check an [address] [place] [address] Street and [facility] looking for signs of menopause  I do not think they will be there  She will continue use condoms for contraception  We will discuss the results of her lab work when it returns  She will continue getting yearly mammograms  Colonoscopy is up-to-date

## 2023-02-20 DIAGNOSIS — J30.89 ALLERGIC RHINITIS DUE TO OTHER ALLERGIC TRIGGER, UNSPECIFIED SEASONALITY: ICD-10-CM

## 2023-02-20 RX ORDER — LEVOCETIRIZINE DIHYDROCHLORIDE 5 MG/1
TABLET, FILM COATED ORAL
Qty: 90 TABLET | Refills: 3 | Status: SHIPPED | OUTPATIENT
Start: 2023-02-20

## 2023-04-06 NOTE — RESULT NOTES
Message   please let her know her urinalysis showed no blood or signs of bacteria, was completely normal  still recommend eval by urology as we discussed   thanks     Verified Results  (1) URINALYSIS (will reflex a microscopy if leukocytes, occult blood, protein or nitrites are not within normal limits) 17Aug2016 09:23PM Hector Nichole Order Number: [id number]     Test Name Result Flag Reference   COLOR Yellow     CLARITY Clear     SPECIFIC GRAVITY UA 1 003  1 003-1 030   PH UA 7 0  4 5-8 0   LEUKOCYTE ESTERASE UA Negative  Negative   NITRITE UA Negative  Negative   PROTEIN UA Negative mg/dl  Negative   GLUCOSE UA Negative mg/dl  Negative   KETONES UA Negative mg/dl  Negative   UROBILINOGEN UA 0 2 E U /dl  0 2, 1 0 E U /dl   BILIRUBIN UA Negative  Negative   BLOOD UA Negative  Negative 4 = No assist / stand by assistance

## 2023-06-12 ENCOUNTER — TELEPHONE (OUTPATIENT)
Dept: FAMILY MEDICINE CLINIC | Facility: CLINIC | Age: 53
End: 2023-06-12

## 2023-06-12 DIAGNOSIS — E55.9 VITAMIN D DEFICIENCY: ICD-10-CM

## 2023-06-12 DIAGNOSIS — R53.83 FATIGUE, UNSPECIFIED TYPE: ICD-10-CM

## 2023-06-12 DIAGNOSIS — Z13.220 ENCOUNTER FOR SCREENING FOR LIPID DISORDER: ICD-10-CM

## 2023-06-12 DIAGNOSIS — E53.8 VITAMIN B12 DEFICIENCY: Primary | ICD-10-CM

## 2023-06-12 NOTE — TELEPHONE ENCOUNTER
Pt called and scheduled annual px 23, lab orders  and is wondering if new ones can be put in so she can have done prior to appt

## 2023-06-16 PROBLEM — B96.29 UTI DUE TO EXTENDED-SPECTRUM BETA LACTAMASE (ESBL) PRODUCING ESCHERICHIA COLI: Status: RESOLVED | Noted: 2020-02-18 | Resolved: 2023-06-16

## 2023-06-16 PROBLEM — N39.0 UTI DUE TO EXTENDED-SPECTRUM BETA LACTAMASE (ESBL) PRODUCING ESCHERICHIA COLI: Status: RESOLVED | Noted: 2020-02-18 | Resolved: 2023-06-16

## 2023-06-16 PROBLEM — Z16.12 UTI DUE TO EXTENDED-SPECTRUM BETA LACTAMASE (ESBL) PRODUCING ESCHERICHIA COLI: Status: RESOLVED | Noted: 2020-02-18 | Resolved: 2023-06-16

## 2023-06-19 ENCOUNTER — APPOINTMENT (OUTPATIENT)
Dept: LAB | Facility: CLINIC | Age: 53
End: 2023-06-19
Payer: COMMERCIAL

## 2023-06-19 DIAGNOSIS — E55.9 VITAMIN D DEFICIENCY: ICD-10-CM

## 2023-06-19 DIAGNOSIS — E53.8 VITAMIN B12 DEFICIENCY: ICD-10-CM

## 2023-06-19 DIAGNOSIS — Z78.0 MENOPAUSE: ICD-10-CM

## 2023-06-19 DIAGNOSIS — R53.83 FATIGUE, UNSPECIFIED TYPE: ICD-10-CM

## 2023-06-19 DIAGNOSIS — Z13.220 ENCOUNTER FOR SCREENING FOR LIPID DISORDER: ICD-10-CM

## 2023-06-19 LAB
25(OH)D3 SERPL-MCNC: 60.6 NG/ML (ref 30–100)
ALBUMIN SERPL BCP-MCNC: 3.9 G/DL (ref 3.5–5)
ALP SERPL-CCNC: 22 U/L (ref 46–116)
ALT SERPL W P-5'-P-CCNC: 22 U/L (ref 12–78)
ANION GAP SERPL CALCULATED.3IONS-SCNC: 1 MMOL/L (ref 4–13)
AST SERPL W P-5'-P-CCNC: 17 U/L (ref 5–45)
BASOPHILS # BLD AUTO: 0.06 THOUSANDS/ÂΜL (ref 0–0.1)
BASOPHILS NFR BLD AUTO: 1 % (ref 0–1)
BILIRUB SERPL-MCNC: 1.21 MG/DL (ref 0.2–1)
BUN SERPL-MCNC: 13 MG/DL (ref 5–25)
CALCIUM SERPL-MCNC: 8.9 MG/DL (ref 8.3–10.1)
CHLORIDE SERPL-SCNC: 110 MMOL/L (ref 96–108)
CHOLEST SERPL-MCNC: 161 MG/DL
CO2 SERPL-SCNC: 25 MMOL/L (ref 21–32)
CREAT SERPL-MCNC: 1.03 MG/DL (ref 0.6–1.3)
EOSINOPHIL # BLD AUTO: 0.11 THOUSAND/ÂΜL (ref 0–0.61)
EOSINOPHIL NFR BLD AUTO: 2 % (ref 0–6)
ERYTHROCYTE [DISTWIDTH] IN BLOOD BY AUTOMATED COUNT: 11.9 % (ref 11.6–15.1)
FSH SERPL-ACNC: 3.4 MIU/ML
GFR SERPL CREATININE-BSD FRML MDRD: 61 ML/MIN/1.73SQ M
GLUCOSE P FAST SERPL-MCNC: 93 MG/DL (ref 65–99)
HCT VFR BLD AUTO: 37.1 % (ref 34.8–46.1)
HDLC SERPL-MCNC: 83 MG/DL
HGB BLD-MCNC: 12.4 G/DL (ref 11.5–15.4)
IMM GRANULOCYTES # BLD AUTO: 0.01 THOUSAND/UL (ref 0–0.2)
IMM GRANULOCYTES NFR BLD AUTO: 0 % (ref 0–2)
LDLC SERPL CALC-MCNC: 66 MG/DL (ref 0–100)
LH SERPL-ACNC: 1.3 MIU/ML
LYMPHOCYTES # BLD AUTO: 1.16 THOUSANDS/ÂΜL (ref 0.6–4.47)
LYMPHOCYTES NFR BLD AUTO: 22 % (ref 14–44)
MCH RBC QN AUTO: 32.9 PG (ref 26.8–34.3)
MCHC RBC AUTO-ENTMCNC: 33.4 G/DL (ref 31.4–37.4)
MCV RBC AUTO: 98 FL (ref 82–98)
MONOCYTES # BLD AUTO: 0.48 THOUSAND/ÂΜL (ref 0.17–1.22)
MONOCYTES NFR BLD AUTO: 9 % (ref 4–12)
NEUTROPHILS # BLD AUTO: 3.37 THOUSANDS/ÂΜL (ref 1.85–7.62)
NEUTS SEG NFR BLD AUTO: 66 % (ref 43–75)
NONHDLC SERPL-MCNC: 78 MG/DL
NRBC BLD AUTO-RTO: 0 /100 WBCS
PLATELET # BLD AUTO: 280 THOUSANDS/UL (ref 149–390)
PMV BLD AUTO: 10.6 FL (ref 8.9–12.7)
POTASSIUM SERPL-SCNC: 4.2 MMOL/L (ref 3.5–5.3)
PROT SERPL-MCNC: 6.9 G/DL (ref 6.4–8.4)
RBC # BLD AUTO: 3.77 MILLION/UL (ref 3.81–5.12)
SODIUM SERPL-SCNC: 136 MMOL/L (ref 135–147)
TRIGL SERPL-MCNC: 61 MG/DL
TSH SERPL DL<=0.05 MIU/L-ACNC: 7.61 UIU/ML (ref 0.45–4.5)
VIT B12 SERPL-MCNC: 702 PG/ML (ref 180–914)
WBC # BLD AUTO: 5.19 THOUSAND/UL (ref 4.31–10.16)

## 2023-06-19 PROCEDURE — 85025 COMPLETE CBC W/AUTO DIFF WBC: CPT

## 2023-06-19 PROCEDURE — 80061 LIPID PANEL: CPT

## 2023-06-19 PROCEDURE — 83001 ASSAY OF GONADOTROPIN (FSH): CPT

## 2023-06-19 PROCEDURE — 82607 VITAMIN B-12: CPT

## 2023-06-19 PROCEDURE — 36415 COLL VENOUS BLD VENIPUNCTURE: CPT

## 2023-06-19 PROCEDURE — 84443 ASSAY THYROID STIM HORMONE: CPT

## 2023-06-19 PROCEDURE — 80053 COMPREHEN METABOLIC PANEL: CPT

## 2023-06-19 PROCEDURE — 83002 ASSAY OF GONADOTROPIN (LH): CPT

## 2023-06-19 PROCEDURE — 82306 VITAMIN D 25 HYDROXY: CPT

## 2023-06-20 ENCOUNTER — TELEPHONE (OUTPATIENT)
Dept: FAMILY MEDICINE CLINIC | Facility: CLINIC | Age: 53
End: 2023-06-20

## 2023-06-20 NOTE — TELEPHONE ENCOUNTER
Please contact patient  Recent blood work reveals underactive thyroid function  This could be the cause of fatigue  We will discuss further at her annual visit on July 12  If patient would like that her appointment could be moved to earlier date    Thank you

## 2023-07-12 ENCOUNTER — OFFICE VISIT (OUTPATIENT)
Dept: FAMILY MEDICINE CLINIC | Facility: CLINIC | Age: 53
End: 2023-07-12
Payer: COMMERCIAL

## 2023-07-12 VITALS
WEIGHT: 145.2 LBS | HEART RATE: 68 BPM | OXYGEN SATURATION: 100 % | HEIGHT: 66 IN | BODY MASS INDEX: 23.33 KG/M2 | TEMPERATURE: 97.5 F | RESPIRATION RATE: 16 BRPM | SYSTOLIC BLOOD PRESSURE: 120 MMHG | DIASTOLIC BLOOD PRESSURE: 68 MMHG

## 2023-07-12 DIAGNOSIS — M76.61 ACHILLES TENDINITIS OF RIGHT LOWER EXTREMITY: ICD-10-CM

## 2023-07-12 DIAGNOSIS — Z00.00 ENCOUNTER FOR WELLNESS EXAMINATION IN ADULT: Primary | ICD-10-CM

## 2023-07-12 DIAGNOSIS — E03.9 HYPOTHYROIDISM, UNSPECIFIED TYPE: ICD-10-CM

## 2023-07-12 PROCEDURE — 99396 PREV VISIT EST AGE 40-64: CPT | Performed by: FAMILY MEDICINE

## 2023-07-12 RX ORDER — AZELAIC ACID 0.15 G/G
AEROSOL, FOAM TOPICAL
COMMUNITY
Start: 2023-06-13

## 2023-07-12 RX ORDER — LEVOTHYROXINE SODIUM 0.03 MG/1
25 TABLET ORAL
Qty: 90 TABLET | Refills: 1 | Status: SHIPPED | OUTPATIENT
Start: 2023-07-12

## 2023-07-12 NOTE — PROGRESS NOTES
Name: Pat Mei      : 1970      MRN: [id number]  Encounter Provider: Essence Adhikari MD  Encounter Date: 2023   Encounter department: [address] [place] [address]     1. Encounter for wellness examination in adult  Comments:  UTD with GYN exam,mammography and colonoscopy    2. Achilles tendinitis of right lower extremity  -     Ambulatory referral to Podiatry; Future    3. Hypothyroidism, unspecified type  -     TSH, 3rd generation; Future; Expected date: 2023  -     T4, free; Future; Expected date: 2023  -     Hepatic function panel; Future; Expected date: 2023  -     levothyroxine (Synthroid) 25 mcg tablet; Take 1 tablet (25 mcg total) by mouth daily in the early morning       Patient is up-to-date with health screenings. Symptoms of fatigue within past few months, blood work is consistent with hypothyroidism. Start levothyroxine, recheck labs in 2 months. Referral to podiatry. Subjective     Annual well exam.      Up-to-date with colonoscopy and GYN exam.  Last mammogram 2022. Patient has been feeling fatigued. No chest pain, palpitations, shortness of breath or dizziness. She has been training to have marathon within past few months, complains of right Achilles discomfort. Results of recent blood work reviewed. Elevated TSH, no previous history of thyroid disease. Family history of thyroid disease-mother. Blood work reveals normal LFTs with mild elevation of bilirubin-1.21. No abdominal pain, nausea vomiting or dyspepsia     Review of Systems   Constitutional: Positive for fatigue. Negative for unexpected weight change. HENT: Negative. Eyes: Negative. Respiratory: Negative. Cardiovascular: Negative. Gastrointestinal: Negative. Endocrine: Negative. Musculoskeletal: Negative. Skin: Negative. Allergic/Immunologic: Negative. Neurological: Negative. Hematological: Negative. Psychiatric/Behavioral: Negative.         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 just lateral to the costovertebral junction     • OTHER SURGICAL HISTORY      ECG normal variant-2007     Family History   Problem Relation Age of Onset   • Skin cancer Mother         melanoma   • Thyroid disease Mother    • Coronary artery disease Father    • Polymyalgia rheumatica Paternal Uncle      Social History     Socioeconomic History   • Marital status: /Civil Union     Spouse name: None   • Number of children: None   • Years of education: None   • Highest education level: None   Occupational History     Comment: working full time   Tobacco Use   • Smoking status: Never   • Smokeless tobacco: Never   Vaping Use   • Vaping Use: Never used   Substance and Sexual Activity   • Alcohol use: Yes     Comment: social   • Drug use: No   • Sexual activity: Yes     Partners: Male     Birth control/protection: Condom Male   Other Topics Concern   • None   Social History Narrative   • None     Social Determinants of Health     Financial Resource Strain: Not on file   Food Insecurity: Not on file   Transportation Needs: Not on file   Physical Activity: Not on file   Stress: Not on file   Social Connections: Not on file   Intimate Partner Violence: Not on file   Housing Stability: Not on file     Current Outpatient Medications on File Prior to Visit   Medication Sig   • Advair Diskus 250-50 MCG/ACT inhaler INHALE 1 PUFF 2 TIMES A DAY RINSE MOUTH AFTER USE. • albuterol (ProAir HFA) 90 mcg/act inhaler Inhale 2 puffs every 4 (four) hours as needed for wheezing or shortness of breath (cough)   • Ascorbic Acid (VITAMIN C PO) Take by mouth in the morning   • Cyanocobalamin (VITAMIN B-12 PO) Take by mouth in the morning   • Finacea 15 % FOAM    • levocetirizine (XYZAL) 5 MG tablet TAKE 1 TABLET BY MOUTH EVERY DAY IN THE EVENING   • METRONIDAZOLE, TOPICAL, 0.75 % LOTN APPLY TWICE A DAY TO FACE FOR ROSACEA CONTROL   • mometasone (NASONEX) 50 mcg/act nasal spray 2 sprays into each nostril daily   • montelukast (SINGULAIR) 10 mg tablet Take 1 tablet (10 mg total) by mouth daily at bedtime   • VITAMIN D PO Take 1 capsule by mouth daily   • BIOTIN PO Take by mouth in the morning (Patient not taking: Reported on 7/12/2023)     Allergies   Allergen Reactions   • Sulfa Antibiotics Hives     Immunization History   Administered Date(s) Administered   • COVID-19 J&J (Mignon) vaccine 0.5 mL 03/16/2021   • COVID-19 MODERNA VACC 0.5 ML IM 12/03/2021   • INFLUENZA 10/28/2019   • Influenza, seasonal, injectable 12/05/2009   • Influenza, seasonal, injectable, preservative free 11/01/2018   • Tdap 06/20/2013       Objective     /68 (BP Location: Left arm, Patient Position: Sitting, Cuff Size: Standard)   Pulse 68   Temp 97.5 °F (36.4 °C) (Temporal)   Resp 16   Ht 5' 6" (1.676 m)   Wt 65.9 kg (145 lb 3.2 oz)   SpO2 100%   BMI 23.44 kg/m²     Physical Exam  Vitals and nursing note reviewed. Constitutional:       General: She is not in acute distress. Appearance: Normal appearance. She is well-developed. She is not ill-appearing. HENT:      Head: Normocephalic and atraumatic. Eyes:      Conjunctiva/sclera: Conjunctivae normal.   Neck:      Thyroid: No thyromegaly or thyroid tenderness. Vascular: No carotid bruit.    Cardiovascular: Rate and Rhythm: Normal rate and regular rhythm. Heart sounds: Normal heart sounds. No murmur heard. Pulmonary:      Effort: Pulmonary effort is normal. No respiratory distress. Breath sounds: Normal breath sounds. No wheezing. Abdominal:      General: Bowel sounds are normal. There is no distension or abdominal bruit. Palpations: Abdomen is soft. Tenderness: There is no abdominal tenderness. Musculoskeletal:         General: Normal range of motion. Cervical back: Neck supple. No rigidity. Right lower leg: No edema. Left lower leg: No edema. Skin:     General: Skin is warm. Neurological:      General: No focal deficit present. Mental Status: She is alert and oriented to person, place, and time. Cranial Nerves: No cranial nerve deficit. Coordination: Coordination normal.   Psychiatric:         Mood and Affect: Mood normal.         Behavior: Behavior normal.         Thought Content:  Thought content normal.       Adriana Mena MD

## 2023-07-20 ENCOUNTER — OFFICE VISIT (OUTPATIENT)
Dept: PODIATRY | Facility: CLINIC | Age: 53
End: 2023-07-20
Payer: COMMERCIAL

## 2023-07-20 VITALS
DIASTOLIC BLOOD PRESSURE: 84 MMHG | WEIGHT: 144 LBS | HEART RATE: 55 BPM | BODY MASS INDEX: 23.14 KG/M2 | HEIGHT: 66 IN | SYSTOLIC BLOOD PRESSURE: 136 MMHG

## 2023-07-20 DIAGNOSIS — M25.571 ACUTE RIGHT ANKLE PAIN: ICD-10-CM

## 2023-07-20 DIAGNOSIS — M76.61 ACHILLES TENDINITIS OF RIGHT LOWER EXTREMITY: Primary | ICD-10-CM

## 2023-07-20 PROCEDURE — 99243 OFF/OP CNSLTJ NEW/EST LOW 30: CPT | Performed by: PODIATRIST

## 2023-07-20 RX ORDER — MELOXICAM 15 MG/1
15 TABLET ORAL DAILY
Qty: 30 TABLET | Refills: 0 | Status: SHIPPED | OUTPATIENT
Start: 2023-07-20

## 2023-07-20 NOTE — PROGRESS NOTES
PATIENT:  Lore Hall  1970         ASSESSMENT:     1. Achilles tendinitis of right lower extremity  Ambulatory referral to Podiatry    Ambulatory referral to Physical Therapy    Cam Boot    meloxicam (MOBIC) 15 mg tablet      2. Acute right ankle pain  XR ankle 3+ vw right                PLAN:  1. Patient was counseled and educated on the condition and the diagnosis. 2. X-ray was obtained and personally reviewed. The radiological findings were discussed with the patient. 3. The exam and symptoms are consistent with achilles tendinosis. The diagnosis, treatment options and prognosis were discussed with the patient. 4. CAM walker for partial immobilization. Start her on PT/OT. 5. Rx: Meloxicam.  6. Instructed supportive care, home exercise, icing, and resting. 7. Patient will return in 6 weeks for re-evaluation. Imaging: I have personally reviewed pertinent films in PACS  Labs, pathology, and Other Studies: I have personally reviewed pertinent reports. Subjective:       HPI  The patient was referred to my office for pain in right ankle. She has chief complaint of pain in right achilles tendon about 6 weeks. She does not recall actual injury, but is very active on her feet. The patient tried OTC meds and stretching without relieving the pain. She also noticed some swelling. No associated numbness or paresthesia. No significant weakness or dysfunction. The following portions of the patient's history were reviewed and updated as appropriate: allergies, current medications, past family history, past medical history, past social history, past surgical history and problem list.  All pertinent labs and images were reviewed.       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  60 blister 0   • albuterol (ProAir HFA) 90 mcg/act inhaler Inhale 2 puffs every 4 (four) hours as needed for wheezing or shortness of breath (cough) 8.5 g 2   • Ascorbic Acid (VITAMIN C PO) Take by mouth in the morning     • Cyanocobalamin (VITAMIN B-12 PO) Take by mouth in the morning     • Finacea 15 % FOAM      • levocetirizine (XYZAL) 5 MG tablet TAKE 1 TABLET BY MOUTH EVERY DAY IN THE EVENING 90 tablet 3   • levothyroxine (Synthroid) 25 mcg tablet Take 1 tablet (25 mcg total) by mouth daily in the early morning 90 tablet 1   • meloxicam (MOBIC) 15 mg tablet Take 1 tablet (15 mg total) by mouth daily after meal 30 tablet 0   • METRONIDAZOLE, TOPICAL, 0.75 % LOTN APPLY TWICE A DAY TO FACE FOR ROSACEA CONTROL     • mometasone (NASONEX) 50 mcg/act nasal spray 2 sprays into each nostril daily     • montelukast (SINGULAIR) 10 mg tablet Take 1 tablet (10 mg total) by mouth daily at bedtime 30 tablet 2   • VITAMIN D PO Take 1 capsule by mouth daily     • BIOTIN PO Take by mouth in the morning (Patient not taking: Reported on 2023)       Current Facility-Administered Medications   Medication Dose Route Frequency Provider Last Rate Last Admin • cyanocobalamin injection 1,000 mcg  1,000 mcg Intramuscular Q30 Days GONZALO Leyva   1,000 mcg at 06/05/19 1613   • cyanocobalamin injection 1,000 mcg  1,000 mcg Intramuscular Q30 Days Scarlett Man MD   1,000 mcg at 09/12/19 1625       Social History:  Social History     Socioeconomic History   • Marital status: /Civil Union     Spouse name: None   • Number of children: None   • Years of education: None   • Highest education level: None   Occupational History     Comment: working full time   Tobacco Use   • Smoking status: Never   • Smokeless tobacco: Never   Vaping Use   • Vaping Use: Never used   Substance and Sexual Activity   • Alcohol use: Yes     Comment: social   • Drug use: No   • Sexual activity: Yes     Partners: Male     Birth control/protection: Condom Male   Other Topics Concern   • None   Social History Narrative   • None     Social Determinants of Health     Financial Resource Strain: Not on file   Food Insecurity: Not on file   Transportation Needs: Not on file   Physical Activity: Not on file   Stress: Not on file   Social Connections: Not on file   Intimate Partner Violence: Not on file   Housing Stability: Not on file          Review of Systems   Constitutional: Negative for chills and fever. Respiratory: Negative for cough and shortness of breath. Cardiovascular: Negative for chest pain. Gastrointestinal: Negative for nausea and vomiting. Skin: Negative for rash and wound. Allergic/Immunologic: Negative for immunocompromised state. Neurological: Negative for weakness and numbness. Hematological: Negative. Psychiatric/Behavioral: Negative for confusion. Objective:      /84   Pulse 55   Ht 5' 6" (1.676 m)   Wt 65.3 kg (144 lb)   BMI 23.24 kg/m²          Physical Exam  Vitals reviewed. Constitutional:       General: She is not in acute distress. Appearance: She is not toxic-appearing or diaphoretic.    HENT:      Head: Normocephalic and atraumatic. Eyes:      Extraocular Movements: Extraocular movements intact. Cardiovascular:      Rate and Rhythm: Normal rate and regular rhythm. Pulses: Normal pulses. Dorsalis pedis pulses are 2+ on the right side and 2+ on the left side. Posterior tibial pulses are 2+ on the right side and 2+ on the left side. Pulmonary:      Effort: Pulmonary effort is normal. No respiratory distress. Musculoskeletal:         General: Tenderness present. Cervical back: Normal range of motion and neck supple. Right lower leg: No edema. Left lower leg: No edema. Right ankle:      Right Achilles Tendon: Tenderness present. No defects. Benedict's test negative. Right foot: No foot drop. Left foot: No foot drop. Comments: Hypertrophy / mild swelling of right achilles tendon without evidence of rupture. Skin:     General: Skin is warm. Capillary Refill: Capillary refill takes less than 2 seconds. Coloration: Skin is not cyanotic or mottled. Findings: No abscess, ecchymosis or wound. Nails: There is no clubbing. Neurological:      General: No focal deficit present. Mental Status: She is alert and oriented to person, place, and time. Cranial Nerves: No cranial nerve deficit. Sensory: No sensory deficit. Motor: No weakness. Coordination: Coordination normal.   Psychiatric:         Mood and Affect: Mood normal.         Behavior: Behavior normal.         Thought Content:  Thought content normal.         Judgment: Judgment normal.

## 2023-07-20 NOTE — LETTER
July 20, 2023     Heber Winkler MD  [address] [place] [address] [address] [id number]    Patient: Sixto Koch   YOB: 1970   Date of Visit: 7/20/2023       Dear Dr. Marc Po: Thank you for referring Jacki Jo to me for evaluation. Below are my notes for this consultation. If you have questions, please do not hesitate to call me. I look forward to following your patient along with you. Sincerely,        Jean Dietrich DPM        CC: No Recipients    ELIZA Lewis [facility]  7/20/2023  5:23 PM  Sign when Signing Visit                 PATIENT:  Sixto Koch  1970         ASSESSMENT:    1. Achilles tendinitis of right lower extremity  Ambulatory referral to Podiatry    Ambulatory referral to Physical Therapy    Cam Boot    meloxicam (MOBIC) 15 mg tablet      2. Acute right ankle pain  XR ankle 3+ vw right               PLAN:  1. Patient was counseled and educated on the condition and the diagnosis. 2. X-ray was obtained and personally reviewed. The radiological findings were discussed with the patient. 3. The exam and symptoms are consistent with achilles tendinosis. The diagnosis, treatment options and prognosis were discussed with the patient. 4. CAM walker for partial immobilization. Start her on PT/OT. 5. Rx: Meloxicam.  6. Instructed supportive care, home exercise, icing, and resting. 7. Patient will return in 6 weeks for re-evaluation. Imaging: I have personally reviewed pertinent films in PACS  Labs, pathology, and Other Studies: I have personally reviewed pertinent reports. Subjective:       HPI  The patient was referred to my office for pain in right ankle. She has chief complaint of pain in right achilles tendon about 6 weeks. She does not recall actual injury, but is very active on her feet. The patient tried OTC meds and stretching without relieving the pain. She also noticed some swelling. No associated numbness or paresthesia.   No significant weakness or dysfunction. The following portions of the patient's history were reviewed and updated as appropriate: allergies, current medications, past family history, past medical history, past social history, past surgical history and problem list.  All pertinent labs and images were reviewed. 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  60 blister 0   • albuterol (ProAir HFA) 90 mcg/act inhaler Inhale 2 puffs every 4 (four) hours as needed for wheezing or shortness of breath (cough) 8.5 g 2   • Ascorbic Acid (VITAMIN C PO) Take by mouth in the morning     • Cyanocobalamin (VITAMIN B-12 PO) Take by mouth in the morning     • Finacea 15 % FOAM      • levocetirizine (XYZAL) 5 MG tablet TAKE 1 TABLET BY MOUTH EVERY DAY IN THE EVENING 90 tablet 3   • levothyroxine (Synthroid) 25 mcg tablet Take 1 tablet (25 mcg total) by mouth daily in the early morning 90 tablet 1   • meloxicam (MOBIC) 15 mg tablet Take 1 tablet (15 mg total) by mouth daily after meal 30 tablet 0   • METRONIDAZOLE, TOPICAL, 0.75 % LOTN APPLY TWICE A DAY TO FACE FOR ROSACEA CONTROL     • mometasone (NASONEX) 50 mcg/act nasal spray 2 sprays into each nostril daily     • montelukast (SINGULAIR) 10 mg tablet Take 1 tablet (10 mg total) by mouth daily at bedtime 30 tablet 2   • VITAMIN D PO Take 1 capsule by mouth daily     • BIOTIN PO Take by mouth in the morning (Patient not taking: Reported on 7/12/2023)       Current Facility-Administered Medications   Medication Dose Route Frequency Provider Last Rate Last Admin   • cyanocobalamin injection 1,000 mcg  1,000 mcg Intramuscular Q30 Days GONZALO Leyva   1,000 mcg at 06/05/19 1613   • cyanocobalamin injection 1,000 mcg  1,000 mcg Intramuscular Q30 Days Brianna Lockhart MD   1,000 mcg at 09/12/19 1625       Social History:  Social History     Socioeconomic History   • Marital status: /Civil Union     Spouse name: None   • Number of children: None   • Years of education: None   • Highest education level: None   Occupational History     Comment: working full time   Tobacco Use   • Smoking status: Never   • Smokeless tobacco: Never   Vaping Use   • Vaping Use: Never used   Substance and Sexual Activity   • Alcohol use: Yes     Comment: social   • Drug use: No   • Sexual activity: Yes     Partners: Male     Birth control/protection: Condom Male   Other Topics Concern   • None   Social History Narrative   • None     Social Determinants of Health     Financial Resource Strain: Not on file   Food Insecurity: Not on file   Transportation Needs: Not on file   Physical Activity: Not on file   Stress: Not on file   Social Connections: Not on file   Intimate Partner Violence: Not on file   Housing Stability: Not on file          Review of Systems   Constitutional: Negative for chills and fever. Respiratory: Negative for cough and shortness of breath. Cardiovascular: Negative for chest pain. Gastrointestinal: Negative for nausea and vomiting. Skin: Negative for rash and wound. Allergic/Immunologic: Negative for immunocompromised state. Neurological: Negative for weakness and numbness. Hematological: Negative. Psychiatric/Behavioral: Negative for confusion. Objective:      /84   Pulse 55   Ht 5' 6" (1.676 m)   Wt 65.3 kg (144 lb)   BMI 23.24 kg/m²         Physical Exam  Vitals reviewed. Constitutional:       General: She is not in acute distress. Appearance: She is not toxic-appearing or diaphoretic. HENT:      Head: Normocephalic and atraumatic. Eyes:      Extraocular Movements: Extraocular movements intact. Cardiovascular:      Rate and Rhythm: Normal rate and regular rhythm. Pulses: Normal pulses. Dorsalis pedis pulses are 2+ on the right side and 2+ on the left side. Posterior tibial pulses are 2+ on the right side and 2+ on the left side. Pulmonary:      Effort: Pulmonary effort is normal. No respiratory distress. Musculoskeletal:         General: Tenderness present. Cervical back: Normal range of motion and neck supple. Right lower leg: No edema. Left lower leg: No edema. Right ankle:      Right Achilles Tendon: Tenderness present. No defects. Benedict's test negative. Right foot: No foot drop. Left foot: No foot drop. Comments: Hypertrophy / mild swelling of right achilles tendon without evidence of rupture. Skin:     General: Skin is warm. Capillary Refill: Capillary refill takes less than 2 seconds. Coloration: Skin is not cyanotic or mottled. Findings: No abscess, ecchymosis or wound. Nails: There is no clubbing. Neurological:      General: No focal deficit present. Mental Status: She is alert and oriented to person, place, and time. Cranial Nerves: No cranial nerve deficit. Sensory: No sensory deficit. Motor: No weakness.       Coordination: Coordination normal.   Psychiatric:         Mood and Affect: Mood normal.         Behavior: Behavior normal.         Thought Content:  Thought content normal.         Judgment: Judgment normal.

## 2023-07-21 ENCOUNTER — EVALUATION (OUTPATIENT)
Dept: PHYSICAL THERAPY | Facility: CLINIC | Age: 53
End: 2023-07-21
Payer: COMMERCIAL

## 2023-07-21 DIAGNOSIS — M76.61 ACHILLES TENDINITIS OF RIGHT LOWER EXTREMITY: Primary | ICD-10-CM

## 2023-07-21 PROCEDURE — 97162 PT EVAL MOD COMPLEX 30 MIN: CPT | Performed by: PHYSICAL THERAPIST

## 2023-07-21 PROCEDURE — 97530 THERAPEUTIC ACTIVITIES: CPT | Performed by: PHYSICAL THERAPIST

## 2023-07-21 NOTE — PROGRESS NOTES
Physical Therapy Initial Evaluation    Today's date: 2023  Patient name: Javier Murillo  : 1970  MRN: [id number]  Referring provider: Irlanda Hi DPM  Dx:   Encounter Diagnosis     ICD-10-CM    1. Achilles tendinitis of right lower extremity  M76.61                      Assessment  Assessment details: Pt is 53 y/o female with Dx. of R Achilles tendonitis. Pt would benefits from PT tx for R LE/ankle strengthening/stretching/pain management and gait and balance traiing progression with HEP education. Impairments: abnormal gait, abnormal or restricted ROM, activity intolerance, impaired balance, impaired physical strength, lacks appropriate home exercise program, pain with function, poor posture  and poor body mechanics  Understanding of Dx/Px/POC: excellent  Goals  (up to 6 weeks)  1. Independent and safe with HEP. 2. Independent with self-pain management (home use of ice/rest/pain meds)  3. R ankle AROM WFL t/o - no limitations to max R LE flexibility with ADL's.  4. R ankle MMT 5/5 t/o to be ready to return to running. Plan  Patient would benefit from: skilled physical therapy  Planned modality interventions: low level laser therapy  Planned therapy interventions: manual therapy, neuromuscular re-education, patient education, postural training, strengthening, stretching, therapeutic activities, therapeutic exercise, therapeutic training, home exercise program, graded exercise, graded activity, gait training, functional ROM exercises, flexibility, body mechanics training, balance/weight bearing training, balance and activity modification  Frequency: 2x week  Duration in weeks: 6  Treatment plan discussed with: patient        Subjective Evaluation    History of Present Illness  Date of onset: 2023  Mechanism of injury: Pt is 53 y/o female with Hx of R achilles tendonitis for about 1 month. Some slight change in symptoms with self-stretching tech of R ankle.  Pt was seen by podiatry and was given R LE CAM walker and referred to [facility] for evaluation and tx. Current functional limitations: pain with R LE WB/amb/running/driving car. Not a recurrent problem   Quality of life: good    Patient Goals  Patient goals for therapy: return to sport/leisure activities, decreased pain, increased motion, improved balance and increased strength    Pain  Current pain ratin  At best pain ratin  At worst pain ratin  Pain location: R achilles tendon. Quality: dull ache  Relieving factors: rest, support and medications  Aggravating factors: walking, standing and running  Progression: improved    Social Support  Steps to enter house: yes (2 steps)  Stairs in house: yes (1 flight with u/l HR)   Lives in: multiple-level home  Lives with: spouse and adult children    Employment status: working  Hand dominance: right      Diagnostic Tests  X-ray: normal  Treatments  Current treatment: immobilization and medication        Objective     Static Posture   General Observations  Asymmetrical weight bearing and shifted left. Palpation     Additional Palpation Details  (+) tenderness in R Achilles tendon region.     Active Range of Motion   Left Ankle/Foot   Normal active range of motion  Dorsiflexion (ke): 25 degrees   Dorsiflexion (kf): 25 degrees   Plantar flexion: 45 degrees   Inversion: 45 degrees   Eversion: 15 degrees     Right Ankle/Foot   Dorsiflexion (ke): 5 degrees   Dorsiflexion (kf): 5 degrees   Plantar flexion: 40 degrees   Inversion: 40 degrees   Eversion: 5 degrees     Strength/Myotome Testing     Left Ankle/Foot   Normal strength  Dorsiflexion: 5  Plantar flexion: 5  Inversion: 5  Eversion: 5  Great toe flexion: 5  Great toe extension: 5    Right Ankle/Foot   Dorsiflexion: 3+  Plantar flexion: 3+  Inversion: 3+  Eversion: 3+  Great toe flexion: 5  Great toe extension: 5    Ambulation   Weight-Bearing Status   Weight-Bearing Status (Right): weight-bearing as tolerated      Additional Weight-Bearing Status Details  R LE CAM walker use. Ambulation: Level Surfaces   Ambulation with assistive device: independent  Ambulation without assistive device: independent    Observational Gait   Gait: antalgic and asymmetric   Decreased walking speed, stride length, right stance time, right swing time and right step length. Base of support: increased    Functional Assessment        Comments  Decreased dynamic standing u/l balance on R.              Precautions: (+) cam walker use for amb, WBAT R LE      Manuals 7/21            PROM/stretch R ankle             LA to R ankle/achilles             R calf TPR/manual tx                          Neuro Re-Ed                          Bike/posture                                                                              Ther Ex             Ankle pumps edu            Ankle circles edu            Ankle stretch w strap edu                                                                             Ther Activity             HEP review/edu 8'                         Gait Training                                       Modalities

## 2023-07-27 ENCOUNTER — OFFICE VISIT (OUTPATIENT)
Dept: PHYSICAL THERAPY | Facility: CLINIC | Age: 53
End: 2023-07-27
Payer: COMMERCIAL

## 2023-07-27 DIAGNOSIS — M76.61 ACHILLES TENDINITIS OF RIGHT LOWER EXTREMITY: Primary | ICD-10-CM

## 2023-07-27 PROCEDURE — 97140 MANUAL THERAPY 1/> REGIONS: CPT | Performed by: PHYSICAL THERAPIST

## 2023-07-27 PROCEDURE — 97110 THERAPEUTIC EXERCISES: CPT | Performed by: PHYSICAL THERAPIST

## 2023-07-27 NOTE — PROGRESS NOTES
Daily Note     Today's date: 2023  Patient name: Chani Orellana  : 1970  MRN: [id number]  Referring provider: Leo Wilson DPM  Dx:   Encounter Diagnosis     ICD-10-CM    1. Achilles tendinitis of right lower extremity  M76.61                      Subjective: Pt reports no new changes. Continues to wear CAM boot. Objective: See treatment diary below      Assessment: Pt had good tolerance to initiation of tx. Increased tenderness at medial achilles with EPAT. Plan: Continue per plan of care.       Precautions: (+) cam walker use for amb, WBAT R LE      Manuals            PROM/stretch R ankle  Prone DF stretch           EPAT R achilles  R15 2.5 barr           IASTM R achilles/calf                          Neuro Re-Ed                                                                                                        Ther Ex             Upright bike  6'           Gastroc/soleus stretch  3x30" ea           Wobble board all dir  x20ea           Eccentric achilles lowering             SL HR                                                    Ther Activity             HEP review/edu 8' review                        Gait Training                                       Modalities

## 2023-07-31 ENCOUNTER — OFFICE VISIT (OUTPATIENT)
Dept: PHYSICAL THERAPY | Facility: CLINIC | Age: 53
End: 2023-07-31
Payer: COMMERCIAL

## 2023-07-31 DIAGNOSIS — M76.61 ACHILLES TENDINITIS OF RIGHT LOWER EXTREMITY: Primary | ICD-10-CM

## 2023-07-31 PROCEDURE — 97140 MANUAL THERAPY 1/> REGIONS: CPT

## 2023-07-31 PROCEDURE — 97110 THERAPEUTIC EXERCISES: CPT

## 2023-07-31 NOTE — PROGRESS NOTES
Daily Note     Today's date: 2023  Patient name: Kelly Spine  : 1970  MRN: [id number]  Referring provider: Trino Dickey DPM  Dx:   Encounter Diagnosis     ICD-10-CM    1. Achilles tendinitis of right lower extremity  M76.61                      Subjective: Pt reports she is doing ok, was more active this weekend and it wasn't too bad. Pt reports she only has some soreness/pain when she wakes up in the morning. Objective: See treatment diary below      Assessment: Tolerated treatment well. Pt performed all exercises without issues, continue to progress to tolerance. Pt reported mild discomfort when stretching into dorsiflexion today. Patient demonstrated fatigue post treatment, exhibited good technique with therapeutic exercises and would benefit from continued PT      Plan: Continue per plan of care.       Precautions: (+) cam walker use for amb, WBAT R LE      Manuals           PROM/stretch R ankle  Prone DF stretch Prone DF stretch          EPAT R achilles  R15 2.5 barr R15  2.5 barr          IASTM R achilles/calf                          Neuro Re-Ed                                                                                                        Ther Ex             Upright bike  6' 6'          Gastroc/soleus stretch  3x30" ea 3x30"          Wobble board all dir  x20ea x20ea          Eccentric achilles lowering             SL HR                                                    Ther Activity             HEP review/edu 8' review                        Gait Training                                       Modalities

## 2023-08-02 ENCOUNTER — OFFICE VISIT (OUTPATIENT)
Dept: PHYSICAL THERAPY | Facility: CLINIC | Age: 53
End: 2023-08-02
Payer: COMMERCIAL

## 2023-08-02 DIAGNOSIS — M76.61 ACHILLES TENDINITIS OF RIGHT LOWER EXTREMITY: Primary | ICD-10-CM

## 2023-08-02 PROCEDURE — 97140 MANUAL THERAPY 1/> REGIONS: CPT | Performed by: PHYSICAL THERAPIST

## 2023-08-02 PROCEDURE — 97110 THERAPEUTIC EXERCISES: CPT | Performed by: PHYSICAL THERAPIST

## 2023-08-02 NOTE — PROGRESS NOTES
Daily Note     Today's date: 2023  Patient name: Pat Mei  : 1970  MRN: [id number]  Referring provider: Oswaldo Ovalles DPM  Dx:   Encounter Diagnosis     ICD-10-CM    1. Achilles tendinitis of right lower extremity  M76.61                      Subjective: Pt reports that she hasn't had pain except tightness in the morning. Pt has not been wearing CAM boot full time. Objective: See treatment diary below      Assessment: Pt had good tolerance to session. Tenderness at mid achilles with IASTM with tightness in distal soleus. Pt given heel lift for shoe. Plan: Continue per plan of care.       Precautions: (+) cam walker use for amb, WBAT R LE      Manuals          PROM/stretch R ankle  Prone DF stretch Prone DF stretch Prone DF stretch         EPAT R achilles  R15 2.5 barr R15  2.5 barr          IASTM R achilles/calf    MD         Given lift for shoe    MD         Neuro Re-Ed                                                                                                        Ther Ex             Upright bike  6' 6' L3 6'         Gastroc/soleus stretch  3x30" ea 3x30" 3x30" ea         Wobble board all dir  x20ea x20ea          Eccentric achilles lowering    On step x15         SL HR                                                    Ther Activity             HEP review/edu 8' review                        Gait Training                                       Modalities

## 2023-08-03 DIAGNOSIS — E03.9 HYPOTHYROIDISM, UNSPECIFIED TYPE: ICD-10-CM

## 2023-08-03 RX ORDER — LEVOTHYROXINE SODIUM 0.03 MG/1
25 TABLET ORAL
Qty: 90 TABLET | Refills: 0 | Status: SHIPPED | OUTPATIENT
Start: 2023-08-03

## 2023-08-07 ENCOUNTER — OFFICE VISIT (OUTPATIENT)
Dept: PHYSICAL THERAPY | Facility: CLINIC | Age: 53
End: 2023-08-07
Payer: COMMERCIAL

## 2023-08-07 DIAGNOSIS — M76.61 ACHILLES TENDINITIS OF RIGHT LOWER EXTREMITY: Primary | ICD-10-CM

## 2023-08-07 PROCEDURE — 97110 THERAPEUTIC EXERCISES: CPT | Performed by: PHYSICAL THERAPIST

## 2023-08-07 PROCEDURE — 97140 MANUAL THERAPY 1/> REGIONS: CPT | Performed by: PHYSICAL THERAPIST

## 2023-08-09 ENCOUNTER — OFFICE VISIT (OUTPATIENT)
Dept: PHYSICAL THERAPY | Facility: CLINIC | Age: 53
End: 2023-08-09
Payer: COMMERCIAL

## 2023-08-09 DIAGNOSIS — M76.61 ACHILLES TENDINITIS OF RIGHT LOWER EXTREMITY: Primary | ICD-10-CM

## 2023-08-09 PROCEDURE — 97110 THERAPEUTIC EXERCISES: CPT

## 2023-08-09 PROCEDURE — 97112 NEUROMUSCULAR REEDUCATION: CPT

## 2023-08-09 PROCEDURE — 97140 MANUAL THERAPY 1/> REGIONS: CPT

## 2023-08-09 NOTE — PROGRESS NOTES
Daily Note     Today's date: 2023  Patient name: Kelly Spine  : 1970  MRN: [id number]  Referring provider: Trino Dickey DPM  Dx:   Encounter Diagnosis     ICD-10-CM    1. Achilles tendinitis of right lower extremity  M76.61           Start Time:   Stop Time: 841  Total time in clinic (min): 43 minutes    Subjective: Reports that she is doing well! Had a little bit of pain when she took the heel lift out the other day and ran errands and it got a little aggravated[place] Objective: See treatment diary below      Assessment: Tolerated treatment well. Continued with outlined POC. No increase in symptomology throughout session. Initiated proximal hip strengthening re-education to improve proximal hip motor control as well as for patient to perform in addition to HEP to reduce load on distal musculature and tendons for ambulation. Patient demonstrated fatigue post treatment, exhibited good technique with therapeutic exercises and would benefit from continued PT      Plan: Continue per plan of care. Progress treatment as tolerated.        Precautions: (+) cam walker use for amb, WBAT R LE      Manuals  8       PROM/stretch R ankle  Prone DF stretch Prone DF stretch Prone DF stretch Prone DF stretch Prone DF S        EPAT R achilles  R15 2.5 barr R15  2.5 barr  R15 4.0 barr NV        IASTM R achilles/calf    MD NV        Given lift for eze HUNT         Neuro Re-Ed             Plinth 3-way SLR       2x10x3" ea add, abd                                                                                     Ther Ex             Upright bike  6' 6' L3 6' L3 6' L3 8' to increase blood flow       Gastroc/soleus stretch  3x30" ea 3x30" 3x30" ea 3x30" ea 3x30" ea       Wobble board all dir  x20ea x20ea          Eccentric achilles lowering    On step x15 On step x15 On step x15        SL HR             Std fwd lunge DF mob      x10 R Ther Activity             HEP review/edu 8' review                        Gait Training                                       Modalities

## 2023-08-21 ENCOUNTER — OFFICE VISIT (OUTPATIENT)
Dept: PHYSICAL THERAPY | Facility: CLINIC | Age: 53
End: 2023-08-21
Payer: COMMERCIAL

## 2023-08-21 DIAGNOSIS — M76.61 ACHILLES TENDINITIS OF RIGHT LOWER EXTREMITY: Primary | ICD-10-CM

## 2023-08-21 PROCEDURE — 97112 NEUROMUSCULAR REEDUCATION: CPT

## 2023-08-21 PROCEDURE — 97140 MANUAL THERAPY 1/> REGIONS: CPT

## 2023-08-21 PROCEDURE — 97110 THERAPEUTIC EXERCISES: CPT

## 2023-08-21 NOTE — PROGRESS NOTES
Daily Note     Today's date: 2023  Patient name: Herlinda Matos  : 1970  MRN: [id number]  Referring provider: Carlyle Martinez DPM  Dx:   Encounter Diagnosis     ICD-10-CM    1. Achilles tendinitis of right lower extremity  M76.61           Start Time: 0800  Stop Time: 0850  Total time in clinic (min): 50 minutes    Subjective: Pt notes she went on a walk and noticed a little bit of soreness but improved quickly. Objective: See treatment diary below      Assessment: Tolerated treatment well. Pt tolerated increases in eccentric step down and lunges well. Pt was able to complete standing on airex pad noting significant weakness and fatigue. Patient would benefit from continued PT increasing to weight and endurance to activities. Plan: Progress treatment as tolerated.        Precautions: (+) cam walker use for amb, WBAT R LE      Manuals       PROM/stretch R ankle  Prone DF stretch Prone DF stretch Prone DF stretch Prone DF stretch Prone DF S        EPAT R achilles  R15 2.5 barr R15  2.5 barr  R15 4.0 barr NV  D15 3.0 barr      IASTM R achilles/calf    MD NV        Given lift for eze HUNT         Neuro Re-Ed             Plinth 3-way SLR       2x10x3" ea add, abd 2x10x3" Abd, Ext, Flex      SLS and tandem on airex       30"x3 ea                                                                       Ther Ex             Upright bike  6' 6' L3 6' L3 6' L3 8' to increase blood flow L5 6'      Gastroc/soleus stretch  3x30" ea 3x30" 3x30" ea 3x30" ea 3x30" ea 3x30" ea      Wobble board all dir  x20ea x20ea          Eccentric achilles lowering    On step x15 On step x15 On step x15  On step x20      SL HR             Std fwd lunge DF mob      x10 R  2x10R                                Ther Activity             HEP review/edu 8' review                        Gait Training                                       Modalities

## 2023-08-23 ENCOUNTER — OFFICE VISIT (OUTPATIENT)
Dept: PHYSICAL THERAPY | Facility: CLINIC | Age: 53
End: 2023-08-23
Payer: COMMERCIAL

## 2023-08-23 DIAGNOSIS — M76.61 ACHILLES TENDINITIS OF RIGHT LOWER EXTREMITY: Primary | ICD-10-CM

## 2023-08-23 PROCEDURE — 97110 THERAPEUTIC EXERCISES: CPT

## 2023-08-23 PROCEDURE — 97140 MANUAL THERAPY 1/> REGIONS: CPT

## 2023-08-23 NOTE — PROGRESS NOTES
Daily Note     Today's date: 2023  Patient name: Amleia Leslie  : 1970  MRN: [id number]  Referring provider: Regine Medel DPM  Dx:   Encounter Diagnosis     ICD-10-CM    1. Achilles tendinitis of right lower extremity  M76.61           Start Time: 1615  Stop Time: 1655  Total time in clinic (min): 40 minutes       Subjective: Patient reports she walked 2 miles yesterday - "I wasn't pushing it."  Patient report she's been feeling better overall. Objective: See treatment diary below. Assessment: Fatigue noted when performing standing slr exercises. Lateral calf is tender during IASTM. Plan: Continue treatment as per PT plan of care.        Precautions: (+) cam walker use for amb, WBAT R LE      Manuals      PROM/stretch R ankle  Prone DF stretch Prone DF stretch Prone DF stretch Prone DF stretch Prone DF S   JLW     EPAT R achilles  R15 2.5 barr R15  2.5 barr  R15 4.0 barr NV  D15 3.0 barr      IASTM R achilles/calf    MD NV MH  JLW     Given lift for shoe    MD                      Neuro Re-Ed             Plinth 3-way SLR       2x10x3" ea add, abd 2x10x3" Abd, Ext, Flex paul  12.5  30 ea     SLS and tandem on airex       30"x3 ea                                                                       Ther Ex             Upright bike  6' 6' L3 6' L3 6' L3 8' to increase blood flow L5 6' 8'     Gastroc/soleus stretch  3x30" ea 3x30" 3x30" ea 3x30" ea 3x30" ea 3x30" ea pro stretch  30"x3     Wobble board all dir  x20ea x20ea          Eccentric achilles lowering    On step x15 On step x15 On step x15  On step x20 on floor  20     SL HR             Std fwd lunge DF mob      x10 R  2x10R                                Ther Activity             HEP review/edu 8' review                        Gait Training                                       Modalities

## 2023-08-26 DIAGNOSIS — E03.9 HYPOTHYROIDISM, UNSPECIFIED TYPE: ICD-10-CM

## 2023-08-26 RX ORDER — LEVOTHYROXINE SODIUM 0.03 MG/1
25 TABLET ORAL
Qty: 90 TABLET | Refills: 1 | Status: SHIPPED | OUTPATIENT
Start: 2023-08-26

## 2023-08-28 ENCOUNTER — OFFICE VISIT (OUTPATIENT)
Dept: PHYSICAL THERAPY | Facility: CLINIC | Age: 53
End: 2023-08-28
Payer: COMMERCIAL

## 2023-08-28 DIAGNOSIS — M76.61 ACHILLES TENDINITIS OF RIGHT LOWER EXTREMITY: Primary | ICD-10-CM

## 2023-08-28 PROCEDURE — 97110 THERAPEUTIC EXERCISES: CPT

## 2023-08-28 PROCEDURE — 97140 MANUAL THERAPY 1/> REGIONS: CPT

## 2023-08-28 NOTE — PROGRESS NOTES
Daily Note     Today's date: 2023  Patient name: Perla Hanley  : 1970  MRN: [id number]  Referring provider: Maria Guadalupe Ashton DPM  Dx:   Encounter Diagnosis     ICD-10-CM    1. Achilles tendinitis of right lower extremity  M76.61           Start Time: 1659  Stop Time: 1738  Total time in clinic (min): 39 minutes      Subjective: Patient states, "It's a little sore."  Patient reports she did not have time to stretch today and reports she also wore different shoes. Objective: See treatment diary below. Assessment: Manual therapy is tolerated well. Balance is challenged during tandem walking. Plan: Continue treatment as per PT plan of care.        Precautions: (+) cam walker use for amb, WBAT R LE      Manuals     PROM/stretch R ankle  Prone DF stretch Prone DF stretch Prone DF stretch Prone DF stretch Prone DF S   JLW DF stretch  JLW    EPAT R achilles  R15 2.5 barr R15  2.5 barr  R15 4.0 barr NV  D15 3.0 barr  achilles DI15  3.0 bar  calf  D20-T  3.2 bar  3000 ea  JLW    IASTM R achilles/calf    MD NV MH  JLW STM  JLW    Given lift for shoe    MD                      Neuro Re-Ed             Plinth 3-way SLR       2x10x3" ea add, abd 2x10x3" Abd, Ext, Flex paul  12.5  30 ea paul  12.7  30 ea    SLS and tandem on airex       30"x3 ea                                                                       Ther Ex             Upright bike  6' 6' L3 6' L3 6' L3 8' to increase blood flow L5 6' 8' 8'    Gastroc/soleus stretch  3x30" ea 3x30" 3x30" ea 3x30" ea 3x30" ea 3x30" ea pro stretch  30"x3 pro stretch  30"x3    Wobble board all dir  x20ea x20ea          Eccentric achilles lowering    On step x15 On step x15 On step x15  On step x20 on floor  20     SL HR             Std fwd lunge DF mob      x10 R  2x10R      Tandem walking         foam  4 laps                 Ther Activity             HEP review/edu 8' review                        Gait Training Modalities

## 2023-08-30 ENCOUNTER — OFFICE VISIT (OUTPATIENT)
Dept: PHYSICAL THERAPY | Facility: CLINIC | Age: 53
End: 2023-08-30
Payer: COMMERCIAL

## 2023-08-30 DIAGNOSIS — M76.61 ACHILLES TENDINITIS OF RIGHT LOWER EXTREMITY: Primary | ICD-10-CM

## 2023-08-30 PROCEDURE — 97110 THERAPEUTIC EXERCISES: CPT

## 2023-08-30 PROCEDURE — 97140 MANUAL THERAPY 1/> REGIONS: CPT

## 2023-08-30 NOTE — PROGRESS NOTES
Daily Note     Today's date: 2023  Patient name: Elizabeth Torres  : 1970  MRN: [id number]  Referring provider: Elena Pascal DPM  Dx:   Encounter Diagnosis     ICD-10-CM    1. Achilles tendinitis of right lower extremity  M76.61           Start Time: 1615  Stop Time: 1655  Total time in clinic (min): 40 minutes      Subjective: Patient reports she's been feeling good over the last 2 days. Objective: See treatment diary below. Assessment: Progression of therapeutic exercise program is tolerated well. Right gastroc remains tender during manual therapy. Patient would benefit from 1 additional EPAT treatment. Plan: Continue treatment as per PT plan of care. Next PT treatment is scheduled for 23.        Precautions: (+) cam walker use for amb, WBAT R LE      Manuals    PROM/stretch R ankle  Prone DF stretch Prone DF stretch Prone DF stretch Prone DF stretch Prone DF S   JLW DF stretch  JLW DF stretch  JLW   EPAT R achilles  R15 2.5 barr R15  2.5 barr  R15 4.0 barr NV  D15 3.0 barr  achilles DI15  3.0 bar  calf  D20-T  3.2 bar  3000 ea  JLW    IASTM R achilles/calf    MD NV MH  JLW STM  JLW graston/STM  JLW   Given lift for shoe    MD                      Neuro Re-Ed             Plinth 3-way SLR       2x10x3" ea add, abd 2x10x3" Abd, Ext, Flex paul  12.5  30 ea paul  12.7  30 ea paul  13.1  30 ea   SLS and tandem on airex       30"x3 ea                                                                       Ther Ex             Upright bike  6' 6' L3 6' L3 6' L3 8' to increase blood flow L5 6' 8' 8' 8'   Gastroc/soleus stretch  3x30" ea 3x30" 3x30" ea 3x30" ea 3x30" ea 3x30" ea pro stretch  30"x3 pro stretch  30"x3 pro stretch  30"x4   Wobble board all dir  x20ea x20ea          Eccentric achilles lowering    On step x15 On step x15 On step x15  On step x20 on floor  20     SL HR             Std fwd lunge DF mob      x10 R  2x10R      bosu step up          fwd, lat  15 ea   Tandem walking         foam  4 laps foam  6 laps                Ther Activity             HEP review/edu 8' review                        Gait Training                                       Modalities

## 2023-08-31 ENCOUNTER — OFFICE VISIT (OUTPATIENT)
Dept: PODIATRY | Facility: CLINIC | Age: 53
End: 2023-08-31
Payer: COMMERCIAL

## 2023-08-31 VITALS
HEART RATE: 88 BPM | BODY MASS INDEX: 23.14 KG/M2 | SYSTOLIC BLOOD PRESSURE: 125 MMHG | DIASTOLIC BLOOD PRESSURE: 64 MMHG | WEIGHT: 144 LBS | HEIGHT: 66 IN

## 2023-08-31 DIAGNOSIS — M76.61 ACHILLES TENDINITIS OF RIGHT LOWER EXTREMITY: Primary | ICD-10-CM

## 2023-08-31 DIAGNOSIS — M25.571 ACUTE RIGHT ANKLE PAIN: ICD-10-CM

## 2023-08-31 PROCEDURE — 99213 OFFICE O/P EST LOW 20 MIN: CPT | Performed by: PODIATRIST

## 2023-08-31 NOTE — PROGRESS NOTES
PATIENT:  Sina Marie  1970         ASSESSMENT:     1. Achilles tendinitis of right lower extremity        2. Acute right ankle pain              PLAN:  1. Reviewed medical records. Reviewed the notes from PT. Patient was counseled and educated on the condition and the diagnosis. 2. The diagnosis, treatment options and prognosis were discussed with the patient. 3. Advance shoes as tolerated. Instructed home exercise/ PT. Meloxicam as needed. 4. Possible PRP injection depending on the progress. 5. Sent her for achilles tendon brace. Imaging: I have personally reviewed pertinent films in PACS  Labs, pathology, and Other Studies: I have personally reviewed pertinent reports. Subjective:     HPI  The patient presents for follow-up on pain in right ankle. Her pain has been minimal.  Decreased swelling. She feels her strength and flexibility improved with PT. No associated numbness or paresthesia. No significant weakness or dysfunction. The following portions of the patient's history were reviewed and updated as appropriate: allergies, current medications, past family history, past medical history, past social history, past surgical history and problem list.  All pertinent labs and images were reviewed.       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  60 blister 0   • albuterol (ProAir HFA) 90 mcg/act inhaler Inhale 2 puffs every 4 (four) hours as needed for wheezing or shortness of breath (cough) 8.5 g 2   • Ascorbic Acid (VITAMIN C PO) Take by mouth in the morning     • Cyanocobalamin (VITAMIN B-12 PO) Take by mouth in the morning     • Finacea 15 % FOAM      • levocetirizine (XYZAL) 5 MG tablet TAKE 1 TABLET BY MOUTH EVERY DAY IN THE EVENING 90 tablet 3   • levothyroxine 25 mcg tablet TAKE 1 TABLET (25 MCG TOTAL) BY MOUTH DAILY IN THE EARLY MORNING 90 tablet 1   • meloxicam (MOBIC) 15 mg tablet Take 1 tablet (15 mg total) by mouth daily after meal 30 tablet 0   • METRONIDAZOLE, TOPICAL, 0.75 % LOTN APPLY TWICE A DAY TO FACE FOR ROSACEA CONTROL     • mometasone (NASONEX) 50 mcg/act nasal spray 2 sprays into each nostril daily     • montelukast (SINGULAIR) 10 mg tablet Take 1 tablet (10 mg total) by mouth daily at bedtime 30 tablet 2   • VITAMIN D PO Take 1 capsule by mouth daily     • BIOTIN PO Take by mouth in the morning (Patient not taking: Reported on 7/12/2023)       Current Facility-Administered Medications   Medication Dose Route Frequency Provider Last Rate Last Admin   • cyanocobalamin injection 1,000 mcg  1,000 mcg Intramuscular Q30 Days GONZALO Leyva   1,000 mcg at 06/05/19 1613   • cyanocobalamin injection 1,000 mcg  1,000 mcg Intramuscular Q30 Days Deejay Esquivel MD   1,000 mcg at 09/12/19 1625       Social History:  Social History     Socioeconomic History   • Marital status: /Civil Union     Spouse name: None   • Number of children: None   • Years of education: None   • Highest education level: None   Occupational History     Comment: working full time   Tobacco Use   • Smoking status: Never   • Smokeless tobacco: Never   Vaping Use   • Vaping Use: Never used   Substance and Sexual Activity   • Alcohol use: Yes     Comment: social   • Drug use: No   • Sexual activity: Yes     Partners: Male     Birth control/protection: Condom Male   Other Topics Concern   • None   Social History Narrative   • None     Social Determinants of Health     Financial Resource Strain: Not on file   Food Insecurity: Not on file   Transportation Needs: Not on file   Physical Activity: Not on file   Stress: Not on file   Social Connections: Not on file   Intimate Partner Violence: Not on file   Housing Stability: Not on file          Review of Systems   Constitutional: Negative for chills and fever. Respiratory: Negative for cough and shortness of breath. Cardiovascular: Negative for chest pain. Gastrointestinal: Negative for nausea and vomiting. Musculoskeletal: Negative for gait problem. Neurological: Negative for weakness and numbness. Objective:      /64   Pulse 88   Ht 5' 6" (1.676 m)   Wt 65.3 kg (144 lb)   BMI 23.24 kg/m²          Physical Exam  Vitals reviewed. Constitutional:       General: She is not in acute distress. Appearance: She is not toxic-appearing or diaphoretic. Cardiovascular:      Rate and Rhythm: Normal rate and regular rhythm. Pulses: Normal pulses. Dorsalis pedis pulses are 2+ on the right side and 2+ on the left side. Posterior tibial pulses are 2+ on the right side and 2+ on the left side. Pulmonary:      Effort: Pulmonary effort is normal. No respiratory distress. Musculoskeletal:         General: No tenderness. Right lower leg: No edema. Left lower leg: No edema. Right ankle:      Right Achilles Tendon: No defects. Benedict's test negative. Right foot: No foot drop. Left foot: No foot drop. Comments: Mild hypertrophy of right achilles tendon without evidence of rupture. Minimal pain. Skin:     General: Skin is warm. Capillary Refill: Capillary refill takes less than 2 seconds. Coloration: Skin is not cyanotic or mottled. Findings: No abscess, ecchymosis or wound. Nails: There is no clubbing. Neurological:      General: No focal deficit present. Mental Status: She is alert and oriented to person, place, and time. Cranial Nerves: No cranial nerve deficit. Sensory: No sensory deficit. Motor: No weakness. Coordination: Coordination normal.   Psychiatric:         Mood and Affect: Mood normal.         Behavior: Behavior normal.         Thought Content:  Thought content normal.         Judgment: Judgment normal.

## 2023-09-06 ENCOUNTER — OFFICE VISIT (OUTPATIENT)
Dept: PHYSICAL THERAPY | Facility: CLINIC | Age: 53
End: 2023-09-06
Payer: COMMERCIAL

## 2023-09-06 DIAGNOSIS — M76.61 ACHILLES TENDINITIS OF RIGHT LOWER EXTREMITY: Primary | ICD-10-CM

## 2023-09-06 PROCEDURE — 97110 THERAPEUTIC EXERCISES: CPT | Performed by: PHYSICAL THERAPIST

## 2023-09-06 PROCEDURE — 97140 MANUAL THERAPY 1/> REGIONS: CPT | Performed by: PHYSICAL THERAPIST

## 2023-09-06 NOTE — PROGRESS NOTES
Daily Note     Today's date: 2023  Patient name: Benedict Brower  : 1970  MRN: [id number]  Referring provider: Ji Basurto DPM  Dx:   Encounter Diagnosis     ICD-10-CM    1. 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 Objective: See treatment diary below      Assessment: Pt had good tolerance to program. Tightness noted in soleus with substantial tenderness in lateral insertion point of achilles on calcaneus. Plan: Continue per plan of care.       Precautions: (+) cam walker use for amb, WBAT R LE      Manuals      PROM/stretch R ankle Prone DF S   JLW DF stretch  JLW DF stretch  JLW Prone DF     EPAT R achilles NV  D15 3.0 barr  achilles DI15  3.0 bar  calf  D20-T  3.2 bar  3000 ea  JLW  achilles DI15  3.0 bar  soleus  D20-T  3.2 bar  3000 ea DC    IASTM R achilles/calf MH  JLW STM  JLW graston/STM  JLW      Given lift for shoe                      Neuro Re-Ed           Plinth 3-way SLR  2x10x3" ea add, abd 2x10x3" Abd, Ext, Flex paul  12.5  30 ea paul  12.7  30 ea paul  13.1  30 ea      SLS and tandem on airex  30"x3 ea                                                                Ther Ex           Upright bike L3 8' to increase blood flow L5 6' 8' 8' 8' 8'     Gastroc/soleus stretch 3x30" ea 3x30" ea pro stretch  30"x3 pro stretch  30"x3 pro stretch  30"x4 pro stretch  30"x4     Wobble board all dir           Eccentric achilles lowering On step x15  On step x20 on floor  20        SL HR           Std fwd lunge DF mob x10 R  2x10R         bosu step up     fwd, lat  15 ea F/l x20 ea     Tandem walking    foam  4 laps foam  6 laps Foam 6 laps                Ther Activity           HEP review/edu                      Gait Training                                 Modalities

## 2023-09-15 DIAGNOSIS — Z12.31 ENCOUNTER FOR SCREENING MAMMOGRAM FOR MALIGNANT NEOPLASM OF BREAST: ICD-10-CM

## 2023-09-20 ENCOUNTER — OFFICE VISIT (OUTPATIENT)
Dept: PHYSICAL THERAPY | Facility: CLINIC | Age: 53
End: 2023-09-20
Payer: COMMERCIAL

## 2023-09-20 DIAGNOSIS — M76.61 ACHILLES TENDINITIS OF RIGHT LOWER EXTREMITY: Primary | ICD-10-CM

## 2023-09-20 PROCEDURE — 97110 THERAPEUTIC EXERCISES: CPT | Performed by: PHYSICAL THERAPIST

## 2023-09-20 PROCEDURE — 97140 MANUAL THERAPY 1/> REGIONS: CPT | Performed by: PHYSICAL THERAPIST

## 2023-09-20 NOTE — PROGRESS NOTES
Daily Note     Today's date: 2023  Patient name: Ponciano Ganser  : 1970  MRN: [id number]  Referring provider: Marialuisa Dc DPM  Dx:   Encounter Diagnosis     ICD-10-CM    1. Achilles tendinitis of right lower extremity  M76.61                      Subjective: Pt reports she had some soreness over the weekend but was able to self manage with stretches. Objective: See treatment diary below      Assessment: Added eccentric achilles lowering and SLS with great toe ext to HEP. Pt to begin slowly returning to exercise at this time. Plan: No further skilled PT required at this time.       Precautions: (+) cam walker use for amb, WBAT R LE      Manuals     PROM/stretch R ankle JLW DF stretch  JLW DF stretch  JLW Prone DF Prone DF    EPAT R achilles  achilles DI15  3.0 bar  calf  D20-T  3.2 bar  3000 ea  JLW  achilles DI15  3.0 bar  soleus  D20-T  3.2 bar  3000 ea DC    IASTM R achilles/calf JLW STM  JLW anna/STM  JLW  anna HUNT    Given lift for shoe                  Neuro Re-Ed         Plinth 3-way SLR  paul  12.5  30 ea paul  12.7  30 ea paul  13.1  30 ea      SLS and tandem on airex                                                      Ther Ex         Upright bike 8' 8' 8' 8' 8'    Gastroc/soleus stretch pro stretch  30"x3 pro stretch  30"x3 pro stretch  30"x4 pro stretch  30"x4 pro stretch  30"x4    SLS     With great toe ext 3x30"    Eccentric achilles lowering on floor  20    On step x15 7" lower    SL HR         Std fwd lunge DF mob         bosu step up   fwd, lat  15 ea F/l x20 ea     Tandem walking  foam  4 laps foam  6 laps Foam 6 laps Foam 6 laps             Ther Activity         HEP review/edu                  Gait Training                           Modalities

## 2023-09-27 DIAGNOSIS — E03.9 HYPOTHYROIDISM, UNSPECIFIED TYPE: ICD-10-CM

## 2023-09-27 RX ORDER — LEVOTHYROXINE SODIUM 0.03 MG/1
25 TABLET ORAL
Qty: 90 TABLET | Refills: 2 | Status: SHIPPED | OUTPATIENT
Start: 2023-09-27

## 2023-10-28 DIAGNOSIS — E03.9 HYPOTHYROIDISM, UNSPECIFIED TYPE: ICD-10-CM

## 2023-10-28 RX ORDER — LEVOTHYROXINE SODIUM 0.03 MG/1
25 TABLET ORAL
Qty: 90 TABLET | Refills: 3 | Status: SHIPPED | OUTPATIENT
Start: 2023-10-28

## 2023-11-24 DIAGNOSIS — E03.9 HYPOTHYROIDISM, UNSPECIFIED TYPE: ICD-10-CM

## 2023-11-24 RX ORDER — LEVOTHYROXINE SODIUM 0.03 MG/1
25 TABLET ORAL
Qty: 90 TABLET | Refills: 0 | Status: SHIPPED | OUTPATIENT
Start: 2023-11-24

## 2023-12-29 DIAGNOSIS — E03.9 HYPOTHYROIDISM, UNSPECIFIED TYPE: ICD-10-CM

## 2023-12-29 RX ORDER — LEVOTHYROXINE SODIUM 0.03 MG/1
25 TABLET ORAL
Qty: 90 TABLET | Refills: 1 | Status: SHIPPED | OUTPATIENT
Start: 2023-12-29

## 2024-01-30 DIAGNOSIS — E03.9 HYPOTHYROIDISM, UNSPECIFIED TYPE: ICD-10-CM

## 2024-01-30 RX ORDER — LEVOTHYROXINE SODIUM 0.03 MG/1
25 TABLET ORAL
Qty: 90 TABLET | Refills: 2 | Status: SHIPPED | OUTPATIENT
Start: 2024-01-30

## 2024-02-21 ENCOUNTER — TELEPHONE (OUTPATIENT)
Dept: FAMILY MEDICINE CLINIC | Facility: CLINIC | Age: 54
End: 2024-02-21

## 2024-02-21 DIAGNOSIS — E03.9 HYPOTHYROIDISM, UNSPECIFIED TYPE: ICD-10-CM

## 2024-02-21 RX ORDER — LEVOTHYROXINE SODIUM 0.03 MG/1
25 TABLET ORAL
Qty: 90 TABLET | Refills: 0 | Status: SHIPPED | OUTPATIENT
Start: 2024-02-21

## 2024-02-22 NOTE — TELEPHONE ENCOUNTER
Called patient, no answer.     left detailed message regarding her blood work needing to be done.

## 2024-02-22 NOTE — TELEPHONE ENCOUNTER
Please contact patient.  I refilled levothyroxine.  She is past past due blood work to retest for thyroid function testing.  Orders for TFTs and hepatic function panel are in her chart.  No need to fast.  Thank you

## 2024-02-25 DIAGNOSIS — J30.89 ALLERGIC RHINITIS DUE TO OTHER ALLERGIC TRIGGER, UNSPECIFIED SEASONALITY: ICD-10-CM

## 2024-02-26 RX ORDER — LEVOCETIRIZINE DIHYDROCHLORIDE 5 MG/1
TABLET, FILM COATED ORAL
Qty: 90 TABLET | Refills: 3 | Status: SHIPPED | OUTPATIENT
Start: 2024-02-26

## 2024-03-02 ENCOUNTER — APPOINTMENT (OUTPATIENT)
Dept: LAB | Facility: CLINIC | Age: 54
End: 2024-03-02
Payer: COMMERCIAL

## 2024-03-02 DIAGNOSIS — E03.9 HYPOTHYROIDISM, UNSPECIFIED TYPE: ICD-10-CM

## 2024-03-02 LAB
ALBUMIN SERPL BCP-MCNC: 4.4 G/DL (ref 3.5–5)
ALP SERPL-CCNC: 23 U/L (ref 34–104)
ALT SERPL W P-5'-P-CCNC: 14 U/L (ref 7–52)
AST SERPL W P-5'-P-CCNC: 25 U/L (ref 13–39)
BILIRUB DIRECT SERPL-MCNC: 0.27 MG/DL (ref 0–0.2)
BILIRUB SERPL-MCNC: 1.15 MG/DL (ref 0.2–1)
PROT SERPL-MCNC: 6.8 G/DL (ref 6.4–8.4)
T4 FREE SERPL-MCNC: 0.6 NG/DL (ref 0.61–1.12)
TSH SERPL DL<=0.05 MIU/L-ACNC: 4.05 UIU/ML (ref 0.45–4.5)

## 2024-03-02 PROCEDURE — 84439 ASSAY OF FREE THYROXINE: CPT

## 2024-03-02 PROCEDURE — 80076 HEPATIC FUNCTION PANEL: CPT

## 2024-03-02 PROCEDURE — 84443 ASSAY THYROID STIM HORMONE: CPT

## 2024-03-02 PROCEDURE — 36415 COLL VENOUS BLD VENIPUNCTURE: CPT

## 2024-03-09 ENCOUNTER — TELEPHONE (OUTPATIENT)
Dept: FAMILY MEDICINE CLINIC | Facility: CLINIC | Age: 54
End: 2024-03-09

## 2024-03-09 NOTE — TELEPHONE ENCOUNTER
Please contact patient.  I received a notification that she has not reviewed [employer] message regarding her recent blood work.  (I sent it on 3/6/24)    Please make sure that patient is aware.    Thank you

## 2024-03-21 DIAGNOSIS — E03.9 HYPOTHYROIDISM, UNSPECIFIED TYPE: ICD-10-CM

## 2024-03-21 RX ORDER — LEVOTHYROXINE SODIUM 0.03 MG/1
TABLET ORAL
Qty: 135 TABLET | Refills: 1 | Status: SHIPPED | OUTPATIENT
Start: 2024-03-21

## 2024-05-16 DIAGNOSIS — E03.9 HYPOTHYROIDISM, UNSPECIFIED TYPE: ICD-10-CM

## 2024-05-17 RX ORDER — LEVOTHYROXINE SODIUM 0.03 MG/1
TABLET ORAL
Qty: 135 TABLET | Refills: 2 | Status: SHIPPED | OUTPATIENT
Start: 2024-05-17

## 2024-05-20 ENCOUNTER — ANNUAL EXAM (OUTPATIENT)
Dept: OBGYN CLINIC | Facility: CLINIC | Age: 54
End: 2024-05-20
Payer: COMMERCIAL

## 2024-05-20 VITALS — SYSTOLIC BLOOD PRESSURE: 108 MMHG | BODY MASS INDEX: 23.57 KG/M2 | WEIGHT: 146 LBS | DIASTOLIC BLOOD PRESSURE: 86 MMHG

## 2024-05-20 DIAGNOSIS — Z01.419 WOMEN'S ANNUAL ROUTINE GYNECOLOGICAL EXAMINATION: Primary | ICD-10-CM

## 2024-05-20 DIAGNOSIS — Z12.31 ENCOUNTER FOR SCREENING MAMMOGRAM FOR BREAST CANCER: ICD-10-CM

## 2024-05-20 PROCEDURE — S0612 ANNUAL GYNECOLOGICAL EXAMINA: HCPCS | Performed by: OBSTETRICS & GYNECOLOGY

## 2024-05-20 NOTE — PATIENT INSTRUCTIONS
The patient was informed of a stable perimenopausal GYN examination.  I have asked her to continue to watch her bleeding pattern.  Look for more signs symptoms of menopause.  Her colonoscopies and mammograms up-to-date.  She is content with her weight.  Denies any prior depression or anxiety.  She should return to my office in 1 year unless new issues occur over the bleeding pattern changes.

## 2024-05-20 NOTE — PROGRESS NOTES
Ambulatory Visit  Name: Jessica Ocasio      : 1970      MRN: [id number]  Encounter Provider: Billy Bojorquez MD  Encounter Date: 2024   Encounter department: OB GYN [facility]    Assessment & Plan   1. Women's annual routine gynecological examination  2. Encounter for screening mammogram for breast cancer    Patient was informed of a stable perimenopausal GYN examination.  Increasing night sweats.  Her menstrual cycles are becoming less heavy.  Will continue to watch and monitor.  If there is a change in her pattern on the more frequent bleeding she will let us know.  She is content with her weight.  She feels safe at home.  She continue taking her thyroid medication.  Colonoscopy is up-to-date.  There is no major  or GI complaint.  Still sexually active continue using condoms.  She should return to my office in 1 year.  There are no new major family illnesses to report.        History of Present Illness     Jessica Ocasio is a 53 y.o. female who presents, for yearly GYN examination.  She is a  2 para 2 with 2 prior  sections.  She still using condoms with option.  She still menstruating.  They are starting to space out less bleeding.  She also admits to increasing night sweats.  She is content with her weight.  She feels safe at home.  She sees a dentist on a regular basis.  Denies any prior depression or anxiety.  Medication list reviewed pertinent is Synthroid for thyroid replacement.  She has asthma medication on a as needed basis.  Still has a history of constipation which is working with that.  No major  or GI complaint.  Colonoscopy is up-to-date.  She will continue get yearly mammograms.  She still sexually active.  There are no new major family illnesses reported at this time.  She still having regular menstrual cycles with a least 3 dry weeks a month.  She will watch her pattern if it changes she will let me know.    Review of Systems   All other systems reviewed  and are negative.      Objective     /86   Wt 66.2 kg (146 lb)   LMP 2024 (Exact Date)   BMI 23.57 kg/m²     Physical Exam  Vitals reviewed. Exam conducted with a chaperone present.   Constitutional:       Appearance: Normal appearance. She is normal weight.   HENT:      Head: Normocephalic and atraumatic.      Nose: Nose normal.      Mouth/Throat:      Mouth: Mucous membranes are moist.   Eyes:      Extraocular Movements: Extraocular movements intact.      Pupils: Pupils are equal, round, and reactive to light.   Cardiovascular:      Rate and Rhythm: Normal rate and regular rhythm.      Pulses: Normal pulses.      Heart sounds: Normal heart sounds.   Pulmonary:      Effort: Pulmonary effort is normal.      Breath sounds: Normal breath sounds.   Chest:   Breasts:     Breasts are symmetrical.      Right: Normal.      Left: Normal.   Abdominal:      General: Abdomen is flat. A surgical scar is present. Bowel sounds are normal. There is no distension.      Palpations: Abdomen is soft. There is no hepatomegaly, splenomegaly, mass or pulsatile mass.      Tenderness: There is no abdominal tenderness.      Hernia: No hernia is present. There is no hernia in the umbilical area or ventral area.          Comments:  section scar well-healed x 2   Genitourinary:     General: Normal vulva.      Pubic Area: No rash or pubic lice.       Labia:         Right: No rash, tenderness, lesion or injury.         Left: No rash, tenderness, lesion or injury.       Urethra: No prolapse, urethral pain, urethral swelling or urethral lesion.      Vagina: Normal. No signs of injury and foreign body. No vaginal discharge, erythema, tenderness, lesions or prolapsed vaginal walls.      Cervix: Normal.      Uterus: Normal.       Adnexa: Right adnexa normal and left adnexa normal.        Right: No mass, tenderness or fullness.          Left: No mass, tenderness or fullness.        Rectum: Normal.      Comments: The external  genitalia normal limits the vagina is clean and the menses are present.  Cervix is closed.  Uterus is midposition normal size.  There is no cervical motion tenderness.  There is no evidence of prolapse.  A Pap smear was not performed.  The urethra and bladder normal working relationship.  Musculoskeletal:         General: Normal range of motion.      Cervical back: Normal range of motion and neck supple.   Lymphadenopathy:      Upper Body:      Right upper body: No supraclavicular or axillary adenopathy.      Left upper body: No supraclavicular or axillary adenopathy.   Skin:     General: Skin is warm and dry.   Neurological:      General: No focal deficit present.      Mental Status: She is alert and oriented to person, place, and time.   Psychiatric:         Mood and Affect: Mood normal.         Behavior: Behavior normal.       Administrative Statements

## 2024-06-19 ENCOUNTER — NURSE TRIAGE (OUTPATIENT)
Age: 54
End: 2024-06-19

## 2024-06-19 NOTE — TELEPHONE ENCOUNTER
"Patient calling reporting having prolonged spotting for the past 2 weeks.  She reports the first week she was spotting and now this week, she has had a couple 'gushes' that required to change pad more frequently.  Pt was last seen on 5/20/24.  Advised patient to continue to monitor bleeding and if soaking through a maxi pad every hour for 2 hours or more, to report to the ER for further evaluation.  Advised the patient that she could take 600 mg of ibuprofen as needed as well.  Patient was wondering what her next steps are since this is her 3rd week of spotting/bleeding.  Patient verbalized understanding and voiced appreciation for phone call.    Will forward to last seen provider for any additional recommendations.        Reason for Disposition   Age > 39 years with irregular or excessive bleeding    Answer Assessment - Initial Assessment Questions  1. AMOUNT: \"Describe the bleeding that you are having.\"     - SPOTTING: spotting, or pinkish / brownish mucous discharge; does not fill panti-liner or pad     - MILD:  less than 1 pad / hour; less than patient's usual menstrual bleeding    - MODERATE: 1-2 pads / hour; 1 menstrual cup every 6 hours; small-medium blood clots (e.g., pea, grape, small coin)    - SEVERE: soaking 2 or more pads/hour for 2 or more hours; 1 menstrual cup every 2 hours; bleeding not contained by pads or continuous red blood from vagina; large blood clots (e.g., golf ball, large coin)       Moderate amount-was mild in the beginning   2. ONSET: \"When did the bleeding begin?\" \"Is it continuing now?\"      2 weeks ago   3. MENSTRUAL PERIOD: \"When was the last normal menstrual period?\" \"How is this different than your period?\"      Normal up until   4. REGULARITY: \"How regular are your periods?\"      Regular until May   5. ABDOMINAL PAIN: \"Do you have any pain?\" \"How bad is the pain?\"  (e.g., Scale 1-10; mild, moderate, or severe)    - MILD (1-3): doesn't interfere with normal activities, abdomen soft " "and not tender to touch     - MODERATE (4-7): interferes with normal activities or awakens from sleep, tender to touch     - SEVERE (8-10): excruciating pain, doubled over, unable to do any normal activities       Has had cramping 2/10   6. PREGNANCY: \"Could you be pregnant?\" \"Are you sexually active?\" \"Did you recently give birth?\"      Denies   7. BREASTFEEDING: \"Are you breastfeeding?\"      Denies   8. HORMONES: \"Are you taking any hormone medications, prescription or OTC?\" (e.g., birth control pills, estrogen)      Denies   9. BLOOD THINNERS: \"Do you take any blood thinners?\" (e.g., Coumadin/warfarin, Pradaxa/dabigatran, aspirin)      Denies   10. CAUSE: \"What do you think is causing the bleeding?\" (e.g., recent gyn surgery, recent gyn procedure; known bleeding disorder, cervical cancer, polycystic ovarian disease, fibroids)          Pre-menopausal   11. HEMODYNAMIC STATUS: \"Are you weak or feeling lightheaded?\" If Yes, ask: \"Can you stand and walk normally?\"         Denies   12. OTHER SYMPTOMS: \"What other symptoms are you having with the bleeding?\" (e.g., passed tissue, vaginal discharge, fever, menstrual-type cramps)        Passed a quarter size clot this morning.    Protocols used: Vaginal Bleeding - Abnormal-ADULT-[place]    "

## 2024-06-19 NOTE — TELEPHONE ENCOUNTER
RN placed a call to patient. Scheduled follow up visit for next week per recommendations. Pt agreeable to plan. No further questions.

## 2024-06-25 ENCOUNTER — OFFICE VISIT (OUTPATIENT)
Dept: OBGYN CLINIC | Facility: CLINIC | Age: 54
End: 2024-06-25
Payer: COMMERCIAL

## 2024-06-25 VITALS — SYSTOLIC BLOOD PRESSURE: 118 MMHG | BODY MASS INDEX: 23.5 KG/M2 | WEIGHT: 145.6 LBS | DIASTOLIC BLOOD PRESSURE: 72 MMHG

## 2024-06-25 DIAGNOSIS — N93.9 ABNORMAL UTERINE BLEEDING (AUB): ICD-10-CM

## 2024-06-25 DIAGNOSIS — N93.9 ABNORMAL UTERINE BLEEDING: Primary | ICD-10-CM

## 2024-06-25 PROCEDURE — 99214 OFFICE O/P EST MOD 30 MIN: CPT | Performed by: OBSTETRICS & GYNECOLOGY

## 2024-06-25 PROCEDURE — 58100 BIOPSY OF UTERUS LINING: CPT | Performed by: OBSTETRICS & GYNECOLOGY

## 2024-06-25 NOTE — PROGRESS NOTES
This is a 53-year-old white female, she is a  2 para 2.  She is not menopausal.  She still having regular menstrual cycles.  She states that her last period started on May 3 and continued to May 23.  Because of the prolonged nature of this.  We decided do an endometrial biopsy.  We want to make sure there is not a premalignancy situation.  Endometrial biopsy also performed see note below.      Endometrial biopsy    Date/Time: 2024 11:15 AM    Performed by: Billy Bojorquez MD  Authorized by: Billy Bojorquez MD  Universal Protocol:  Consent: Verbal consent obtained.  Consent given by: patient  Timeout called at: 2024 11:15 AM.  Patient understanding: patient states understanding of the procedure being performed  Patient consent: the patient's understanding of the procedure matches consent given  Procedure consent: procedure consent matches procedure scheduled  Site marked: the operative site was not marked  Radiology Images displayed and confirmed. If images not available, report reviewed: imaging studies not available    Indication:     Indications: Other disorder of menstruation and other abnormal bleeding from female genital tract    Procedure:     Procedure: endometrial biopsy with Pipelle      A bivalve speculum was placed in the vagina: yes      Cervix cleaned and prepped: yes      A paracervical block was performed: no      An intracervical block was performed: no      The cervix was dilated: no      Uterus sounded: yes      Uterus sound depth (cm):  4    Specimen collected: specimen collected and sent to pathology      Patient tolerated procedure well with no complications: yes    Findings:     Uterus size:  Non-gravid    Cervix: normal      Adnexa: normal    Comments:     Procedure comments:  Scant amount of tissue endometrial biopsy.  I suspect it will be benign.  Will make arranges for transvaginal ultrasound for endometrial thickness.  The patient will be seen in my office within the next  7 to 10 days.

## 2024-06-25 NOTE — PATIENT INSTRUCTIONS
Patient tolerated the procedure well.  There is no evidence of perforation.  Scant amount tissue was obtained.  I believe this will all be benign.  Will make arranges for ultrasound for endometrial thickness.  She will return to my office in the next week for further evaluation and follow-up.

## 2024-06-27 LAB
CLINICAL INFO: NORMAL
SPECIMEN SOURCE: NORMAL

## 2024-07-02 ENCOUNTER — HOSPITAL ENCOUNTER (OUTPATIENT)
Dept: RADIOLOGY | Facility: HOSPITAL | Age: 54
Discharge: HOME | End: 2024-07-02
Attending: OBSTETRICS & GYNECOLOGY
Payer: COMMERCIAL

## 2024-07-02 DIAGNOSIS — N93.9 ABNORMAL UTERINE BLEEDING: ICD-10-CM

## 2024-07-02 PROCEDURE — 76830 TRANSVAGINAL US NON-OB: CPT

## 2024-07-02 PROCEDURE — 76856 US EXAM PELVIC COMPLETE: CPT

## 2024-07-08 ENCOUNTER — OFFICE VISIT (OUTPATIENT)
Dept: OBGYN CLINIC | Facility: CLINIC | Age: 54
End: 2024-07-08
Payer: COMMERCIAL

## 2024-07-08 VITALS — WEIGHT: 143 LBS | DIASTOLIC BLOOD PRESSURE: 78 MMHG | BODY MASS INDEX: 23.08 KG/M2 | SYSTOLIC BLOOD PRESSURE: 118 MMHG

## 2024-07-08 DIAGNOSIS — N93.9 ABNORMAL UTERINE BLEEDING (AUB): Primary | ICD-10-CM

## 2024-07-08 PROCEDURE — 99213 OFFICE O/P EST LOW 20 MIN: CPT | Performed by: OBSTETRICS & GYNECOLOGY

## 2024-07-08 NOTE — PATIENT INSTRUCTIONS
The patient was informed the results of endometrial biopsy which was benign.  We question whether or not the specimen was completed from the endometrial cavity.  We did have endocervical tissue which was benign.  We are now awaiting results of the ultrasound which was done on July 2 to be released.  When that report is released I will call the patient she will continue to watch her cycles.

## 2024-07-08 NOTE — PROGRESS NOTES
This is a 53-year-old white female, who is not menopausal yet.  She had a cycle of abnormal uterine bleeding that lasted for up to 2 weeks.  She underwent endometrial biopsy.  She is also set up for endometrial thickness via ultrasound.  The endometrial biopsy revealed benign endocervical tissue.  The not sure if they see any true endometrial tissue.  The transvaginal ultrasound which was done on July 2 has not been released yet.  The patient has no more bleeding but she is think she feels her periods, and on.    The patient is aware of benign endometrial biopsy but question will be gotten to the entire canal.  We are awaiting the results of the transvaginal ultrasound.  If the transvaginal ultrasound is negative we will watch and observe.  If it is suspicious she may need to have a hysteroscopy done.  Will inform the patient results later today.

## 2024-09-19 ENCOUNTER — HOSPITAL ENCOUNTER (OUTPATIENT)
Dept: RADIOLOGY | Age: 54
Discharge: HOME | End: 2024-09-19
Payer: COMMERCIAL

## 2024-09-19 DIAGNOSIS — Z12.31 ENCOUNTER FOR SCREENING MAMMOGRAM FOR BREAST CANCER: ICD-10-CM

## 2024-09-19 PROCEDURE — 77063 BREAST TOMOSYNTHESIS BI: CPT

## 2024-09-19 PROCEDURE — 77067 SCR MAMMO BI INCL CAD: CPT

## 2024-10-30 ENCOUNTER — TELEPHONE (OUTPATIENT)
Age: 54
End: 2024-10-30

## 2024-10-30 ENCOUNTER — HOSPITAL ENCOUNTER (OUTPATIENT)
Dept: MAMMOGRAPHY | Facility: CLINIC | Age: 54
Discharge: HOME | End: 2024-10-30
Payer: COMMERCIAL

## 2024-10-30 VITALS — BODY MASS INDEX: 23.3 KG/M2 | WEIGHT: 145 LBS | HEIGHT: 66 IN

## 2024-10-30 DIAGNOSIS — R92.8 ABNORMAL MAMMOGRAM: ICD-10-CM

## 2024-10-30 DIAGNOSIS — R92.8 ABNORMAL MAMMOGRAM OF RIGHT BREAST: Primary | ICD-10-CM

## 2024-10-30 PROCEDURE — 77065 DX MAMMO INCL CAD UNI: CPT

## 2024-10-30 NOTE — TELEPHONE ENCOUNTER
Spoke with patient who was told at mammogram today to repeat in 6 months.  Order placed per radiology recommendation noted on report.

## 2024-12-05 ENCOUNTER — OFFICE VISIT (OUTPATIENT)
Dept: FAMILY MEDICINE CLINIC | Facility: CLINIC | Age: 54
End: 2024-12-05
Payer: COMMERCIAL

## 2024-12-05 VITALS
TEMPERATURE: 97.8 F | HEIGHT: 66 IN | BODY MASS INDEX: 22.92 KG/M2 | WEIGHT: 142.6 LBS | SYSTOLIC BLOOD PRESSURE: 122 MMHG | OXYGEN SATURATION: 100 % | DIASTOLIC BLOOD PRESSURE: 80 MMHG | RESPIRATION RATE: 16 BRPM | HEART RATE: 75 BPM

## 2024-12-05 DIAGNOSIS — H66.91 RIGHT OTITIS MEDIA, UNSPECIFIED OTITIS MEDIA TYPE: ICD-10-CM

## 2024-12-05 DIAGNOSIS — J01.90 ACUTE SINUSITIS, RECURRENCE NOT SPECIFIED, UNSPECIFIED LOCATION: Primary | ICD-10-CM

## 2024-12-05 PROCEDURE — 99213 OFFICE O/P EST LOW 20 MIN: CPT | Performed by: FAMILY MEDICINE

## 2024-12-05 RX ORDER — METHYLPREDNISOLONE 4 MG/1
TABLET ORAL
Qty: 21 EACH | Refills: 0 | Status: SHIPPED | OUTPATIENT
Start: 2024-12-05

## 2024-12-05 RX ORDER — AZITHROMYCIN 250 MG/1
TABLET, FILM COATED ORAL
Qty: 6 TABLET | Refills: 0 | Status: SHIPPED | OUTPATIENT
Start: 2024-12-05 | End: 2024-12-09

## 2024-12-05 NOTE — PROGRESS NOTES
Name: Jessica Ocasio      : 1970      MRN: [id number]  Encounter Provider: Jennifer Christianson MD  Encounter Date: 2024   Encounter department: [facility]    Assessment & Plan  Acute sinusitis, recurrence not specified, unspecified location  Continue Flonase.  Okay to hold daily antihistamine and try DayQuil/NyQuil OTC..  Saline nasal rinses.  Orders:    methylPREDNISolone 4 MG tablet therapy pack; Use as directed on package    azithromycin (ZITHROMAX) 250 mg tablet; Take 2 tablets today then 1 tablet daily x 4 days    Right otitis media, unspecified otitis media type              History of Present Illness     Patient presents for evaluation of URI symptoms for 6 days.  She is accompanied by her .  Patient complains of hoarseness, dry cough and occasional sore throat.  She is complaining of significant sinus pressure and ear pressure.  She uses Flonase.  No need for inhaler.  Patient denies symptoms of chest tightness or wheezing.  Patient is afebrile.  No COVID testing at home.  She has been using Flonase and Xyzal    URI   Associated symptoms include congestion, coughing, ear pain and a sore throat.     Review of Systems   Constitutional: Negative.  Negative for chills and fever.   HENT:  Positive for congestion, ear pain, postnasal drip, sore throat and voice change (hoarseness).    Eyes: Negative.    Respiratory:  Positive for cough.    Cardiovascular: Negative.    Endocrine: Negative.    Neurological: Negative.      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 just lateral to the costovertebral junction      OTHER SURGICAL HISTORY      ECG normal variant-9/13/2007     Family History   Problem Relation Age of Onset    Skin cancer Mother         melanoma    Thyroid disease Mother     Coronary artery disease Father     Polymyalgia rheumatica Paternal Uncle      Social History     Tobacco Use    Smoking status: Never    Smokeless tobacco: Never   Vaping Use    Vaping status: Never Used   Substance and Sexual Activity    Alcohol use: Yes     Comment: social    Drug use: No    Sexual activity: Yes     Partners: Male     Birth control/protection: Condom Male     Current Outpatient Medications on File Prior to Visit   Medication Sig    Ascorbic Acid (VITAMIN C PO) Take by mouth in the morning    Cyanocobalamin (VITAMIN B-12 PO) Take by mouth in the morning    Finacea 15 % FOAM     levocetirizine (XYZAL) 5 MG tablet TAKE 1 TABLET BY MOUTH EVERY DAY IN THE EVENING    levothyroxine 25 mcg tablet TAKE 2 TABLETS ( 50 MCG) ON MONDAYS, WEDNESDAYS AND FRIDAYS, TAKE 1 TABLET (25 MCG)DAILY FOR THE REST OF THE WEEK    METRONIDAZOLE, TOPICAL, 0.75 % LOTN APPLY TWICE A DAY TO FACE FOR ROSACEA CONTROL    mometasone (NASONEX) 50 mcg/act nasal spray 2 sprays into each nostril daily    VITAMIN D PO Take 1 capsule by mouth daily    Advair Diskus 250-50 MCG/ACT inhaler INHALE 1 PUFF 2 TIMES A DAY RINSE MOUTH AFTER USE. (Patient not taking: Reported on 12/5/2024)    albuterol (ProAir HFA) 90 mcg/act inhaler Inhale 2 puffs every 4 (four) hours as needed for wheezing or shortness of breath (cough) (Patient not taking: Reported on 12/5/2024)    BIOTIN PO Take by mouth in the morning (Patient not taking: Reported on 7/12/2023)    meloxicam (MOBIC) 15 mg tablet Take 1 tablet (15 mg total) by mouth daily after meal (Patient  "not taking: Reported on 12/5/2024)    montelukast (SINGULAIR) 10 mg tablet Take 1 tablet (10 mg total) by mouth daily at bedtime (Patient not taking: Reported on 5/20/2024)     Allergies   Allergen Reactions    Sulfa Antibiotics Hives     Immunization History   Administered Date(s) Administered    COVID-19 J&J (ClickTale) vaccine 0.5 mL 03/16/2021    COVID-19 MODERNA VACC 0.5 ML IM 12/03/2021, 07/14/2022    INFLUENZA 10/28/2019    Influenza, seasonal, injectable 12/05/2009    Influenza, seasonal, injectable, preservative free 11/01/2018    Tdap 06/20/2013     Objective   /80 (BP Location: Right arm, Patient Position: Sitting, Cuff Size: Standard)   Pulse 75   Temp 97.8 °F (36.6 °C) (Temporal)   Resp 16   Ht 5' 6\" (1.676 m)   Wt 64.7 kg (142 lb 9.6 oz)   SpO2 100%   BMI 23.02 kg/m²     Physical Exam  Vitals and nursing note reviewed.   Constitutional:       General: She is not in acute distress.     Appearance: Normal appearance. She is well-developed. She is not ill-appearing.   HENT:      Head: Normocephalic and atraumatic.      Comments: Hoarseness     Right Ear: Ear canal normal. A middle ear effusion is present. Tympanic membrane is injected.      Left Ear: Tympanic membrane and ear canal normal. Tympanic membrane is not erythematous.      Nose: Mucosal edema and congestion present.      Mouth/Throat:      Mouth: Mucous membranes are moist.      Pharynx: Posterior oropharyngeal erythema present.      Comments: Postnasal drip  Eyes:      Conjunctiva/sclera: Conjunctivae normal.   Cardiovascular:      Rate and Rhythm: Normal rate and regular rhythm.      Heart sounds: Normal heart sounds. No murmur heard.  Pulmonary:      Effort: Pulmonary effort is normal. No respiratory distress.      Breath sounds: Normal breath sounds. No wheezing or rhonchi.   Musculoskeletal:      Cervical back: Neck supple.   Neurological:      Mental Status: She is alert and oriented to person, place, and time.      Cranial " Nerves: No cranial nerve deficit.      Deep Tendon Reflexes: Reflexes are normal and symmetric.   Psychiatric:         Mood and Affect: Mood normal.         Behavior: Behavior normal.         Thought Content: Thought content normal.

## 2024-12-31 ENCOUNTER — APPOINTMENT (OUTPATIENT)
Dept: LAB | Facility: CLINIC | Age: 54
End: 2024-12-31
Payer: COMMERCIAL

## 2024-12-31 DIAGNOSIS — E03.9 HYPOTHYROIDISM, UNSPECIFIED TYPE: ICD-10-CM

## 2024-12-31 LAB
T4 FREE SERPL-MCNC: 0.63 NG/DL (ref 0.61–1.12)
TSH SERPL DL<=0.05 MIU/L-ACNC: 3.98 UIU/ML (ref 0.45–4.5)

## 2024-12-31 PROCEDURE — 84439 ASSAY OF FREE THYROXINE: CPT

## 2024-12-31 PROCEDURE — 36415 COLL VENOUS BLD VENIPUNCTURE: CPT

## 2024-12-31 PROCEDURE — 84443 ASSAY THYROID STIM HORMONE: CPT

## 2025-01-03 ENCOUNTER — RESULTS FOLLOW-UP (OUTPATIENT)
Dept: FAMILY MEDICINE CLINIC | Facility: CLINIC | Age: 55
End: 2025-01-03

## 2025-02-26 DIAGNOSIS — E03.9 HYPOTHYROIDISM, UNSPECIFIED TYPE: ICD-10-CM

## 2025-02-26 DIAGNOSIS — J30.89 ALLERGIC RHINITIS DUE TO OTHER ALLERGIC TRIGGER, UNSPECIFIED SEASONALITY: ICD-10-CM

## 2025-02-26 RX ORDER — LEVOTHYROXINE SODIUM 25 UG/1
TABLET ORAL
Qty: 135 TABLET | Refills: 1 | Status: SHIPPED | OUTPATIENT
Start: 2025-02-26

## 2025-02-26 RX ORDER — LEVOCETIRIZINE DIHYDROCHLORIDE 5 MG/1
5 TABLET, FILM COATED ORAL EVERY EVENING
Qty: 30 TABLET | Refills: 5 | Status: SHIPPED | OUTPATIENT
Start: 2025-02-26

## 2025-05-27 ENCOUNTER — ANNUAL EXAM (OUTPATIENT)
Dept: OBGYN CLINIC | Facility: CLINIC | Age: 55
End: 2025-05-27
Payer: COMMERCIAL

## 2025-05-27 VITALS — DIASTOLIC BLOOD PRESSURE: 70 MMHG | SYSTOLIC BLOOD PRESSURE: 118 MMHG | BODY MASS INDEX: 23.08 KG/M2 | WEIGHT: 143 LBS

## 2025-05-27 DIAGNOSIS — Z01.419 WOMEN'S ANNUAL ROUTINE GYNECOLOGICAL EXAMINATION: Primary | ICD-10-CM

## 2025-05-27 PROCEDURE — S0612 ANNUAL GYNECOLOGICAL EXAMINA: HCPCS | Performed by: OBSTETRICS & GYNECOLOGY

## 2025-05-27 NOTE — PATIENT INSTRUCTIONS
The patient was informed of a stable perimenopausal GYN examination.  A Pap smear was performed.  Examination is otherwise benign.  She should return to my office in 1 year.  She will continue getting yearly mammograms.  She will watch her irregular menstrual cycles which are consistent with perimenopause.  Let us know.

## 2025-05-27 NOTE — PROGRESS NOTES
Name: Jessica Ocasio      : 1970      MRN: [id number]  Encounter Provider: Billy Bojorquez MD  Encounter Date: 2025   Encounter department: [facility]YN [facility]  :  Assessment & Plan  Women's annual routine gynecological examination    Orders:    Thinprep Tis Pap Reflex HPV mRNA E6/E7    The patient was informed of a stable perimenopausal GYN examination.  A Pap smear was performed.  She is content with her weight.  No major  or GI complaint.  Colonoscopy is due next year.  No new major family illnesses.  She will return to my office in 1 year unless new issues or problems occur.  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    History of Present Illness   HPI  Jessica Ocasio is a 54 y.o. female who presents for annual GYN examination.  She is a  2 para 2 with 2 prior  section deliveries.  Her current method of contraception includes condoms.  Her menstrual cycle finally first started.  Her most recent period was May 14.  Problems are that she had a period in January.  Denies any problem hot flashes or night sweats.  She is content with her weight.  She feels safe at home.  She does have a history of hypothyroidism she is on thyroid replacement medication.  There is no problem with intimacy.  There is no major  or GI complaint.  Her colonoscopy is due next year for history of polyps she goes every 5 years.  There are no new major family illnesses report at this time.  She will need a Pap smear today.  Her PHQ score today is total of 0.      Review of Systems   All other systems reviewed and are negative.         Objective   /70   Wt 64.9 kg (143 lb)   LMP 2025 (Approximate)   BMI 23.08 kg/m²      Physical Exam  Vitals reviewed. Exam conducted with a chaperone present.   Constitutional:       Appearance: Normal appearance. She is normal weight.   HENT:      Head: Normocephalic and atraumatic.      Mouth/Throat:      Mouth: Mucous membranes are moist.      Eyes:      Extraocular Movements: Extraocular movements intact.      Pupils: Pupils are equal, round, and reactive to light.       Cardiovascular:      Rate and Rhythm: Normal rate and regular rhythm.      Pulses: Normal pulses.   Pulmonary:      Effort: Pulmonary effort is normal.      Breath sounds: Normal breath sounds.   Chest:   Breasts:     Breasts are symmetrical.      Right: Normal.      Left: Normal.   Abdominal:      General: Abdomen is flat. A surgical scar is present. There is no distension.      Palpations: Abdomen is soft. There is no hepatomegaly, splenomegaly or mass.      Hernia: No hernia is present. There is no hernia in the left inguinal area or right inguinal area.      Comments: Section scar well-healed x 2   Genitourinary:     General: Normal vulva.      Pubic Area: No rash or pubic lice.       Labia:         Right: No rash or tenderness.         Left: No rash or tenderness.       Urethra: No prolapse or urethral pain.      Vagina: Normal.      Cervix: Normal.      Uterus: Normal.       Adnexa: Right adnexa normal and left adnexa normal.        Right: No mass, tenderness or fullness.          Left: No mass, tenderness or fullness.        Rectum: Normal.      Comments: The external genitalia normal limits the vagina is clean the cervix is close a Pap smear was performed.  The uterus is anterior normal size there is no cervical motion tenderness.  There is no evidence of prolapse.  The adnexa clear bilaterally.  Urethra and bladder normal working relationship.    Musculoskeletal:         General: Normal range of motion.      Cervical back: Normal range of motion and neck supple.   Lymphadenopathy:      Upper Body:      Right upper body: No supraclavicular adenopathy.      Left upper body: No supraclavicular adenopathy.     Skin:     General: Skin is warm and dry.     Neurological:      General: No focal deficit present.      Mental Status: She is alert and oriented to person, place, and  time.     Psychiatric:         Mood and Affect: Mood normal.         Behavior: Behavior normal.

## 2025-06-03 LAB
CLINICAL INFO: NORMAL
CYTO CVX: NORMAL
CYTOLOGY CMNT CVX/VAG CYTO-IMP: NORMAL
DATE PREVIOUS BX: NORMAL
LMP START DATE: NORMAL
SL AMB PREV. PAP:: NORMAL
SPECIMEN SOURCE CVX/VAG CYTO: NORMAL

## 2025-06-30 ENCOUNTER — HOSPITAL ENCOUNTER (OUTPATIENT)
Dept: MAMMOGRAPHY | Facility: CLINIC | Age: 55
Discharge: HOME | End: 2025-06-30
Payer: COMMERCIAL

## 2025-06-30 VITALS — BODY MASS INDEX: 22.98 KG/M2 | WEIGHT: 143 LBS | HEIGHT: 66 IN

## 2025-06-30 DIAGNOSIS — R92.8 ABNORMAL MAMMOGRAM OF RIGHT BREAST: ICD-10-CM

## 2025-06-30 PROCEDURE — 77065 DX MAMMO INCL CAD UNI: CPT

## 2025-07-24 DIAGNOSIS — E03.9 HYPOTHYROIDISM, UNSPECIFIED TYPE: ICD-10-CM

## 2025-07-24 RX ORDER — LEVOTHYROXINE SODIUM 25 UG/1
TABLET ORAL
Qty: 132 TABLET | Refills: 0 | Status: SHIPPED | OUTPATIENT
Start: 2025-07-24

## 2025-08-14 ENCOUNTER — OFFICE VISIT (OUTPATIENT)
Dept: FAMILY MEDICINE CLINIC | Facility: CLINIC | Age: 55
End: 2025-08-14
Payer: COMMERCIAL

## 2025-08-14 VITALS
RESPIRATION RATE: 16 BRPM | BODY MASS INDEX: 22.5 KG/M2 | HEART RATE: 69 BPM | WEIGHT: 140 LBS | SYSTOLIC BLOOD PRESSURE: 100 MMHG | DIASTOLIC BLOOD PRESSURE: 60 MMHG | HEIGHT: 66 IN | TEMPERATURE: 97.6 F | OXYGEN SATURATION: 99 %

## 2025-08-14 DIAGNOSIS — Z00.00 ENCOUNTER FOR WELLNESS EXAMINATION IN ADULT: Primary | ICD-10-CM

## 2025-08-14 DIAGNOSIS — K59.00 CONSTIPATION, UNSPECIFIED CONSTIPATION TYPE: ICD-10-CM

## 2025-08-14 DIAGNOSIS — E03.9 HYPOTHYROIDISM, UNSPECIFIED TYPE: ICD-10-CM

## 2025-08-14 DIAGNOSIS — E53.8 VITAMIN B12 DEFICIENCY: ICD-10-CM

## 2025-08-14 PROCEDURE — 99396 PREV VISIT EST AGE 40-64: CPT | Performed by: FAMILY MEDICINE

## 2025-08-14 RX ORDER — BRIMONIDINE 5 MG/G
GEL TOPICAL
COMMUNITY
Start: 2025-06-11

## 2025-08-14 RX ORDER — IVERMECTIN 10 MG/G
CREAM TOPICAL
COMMUNITY